# Patient Record
Sex: FEMALE | Race: WHITE | NOT HISPANIC OR LATINO | Employment: FULL TIME | URBAN - METROPOLITAN AREA
[De-identification: names, ages, dates, MRNs, and addresses within clinical notes are randomized per-mention and may not be internally consistent; named-entity substitution may affect disease eponyms.]

---

## 2017-05-04 ENCOUNTER — APPOINTMENT (OUTPATIENT)
Dept: WOMENS IMAGING | Facility: HOSPITAL | Age: 42
End: 2017-05-04

## 2017-05-04 PROCEDURE — 77067 SCR MAMMO BI INCL CAD: CPT | Performed by: RADIOLOGY

## 2017-05-04 PROCEDURE — 77063 BREAST TOMOSYNTHESIS BI: CPT | Performed by: RADIOLOGY

## 2018-05-15 ENCOUNTER — APPOINTMENT (OUTPATIENT)
Dept: WOMENS IMAGING | Facility: HOSPITAL | Age: 43
End: 2018-05-15

## 2018-05-15 PROCEDURE — 77067 SCR MAMMO BI INCL CAD: CPT | Performed by: RADIOLOGY

## 2018-05-15 PROCEDURE — 77063 BREAST TOMOSYNTHESIS BI: CPT | Performed by: RADIOLOGY

## 2018-11-07 ENCOUNTER — APPOINTMENT (OUTPATIENT)
Dept: WOMENS IMAGING | Facility: HOSPITAL | Age: 43
End: 2018-11-07

## 2018-11-07 PROCEDURE — 77065 DX MAMMO INCL CAD UNI: CPT | Performed by: RADIOLOGY

## 2018-11-07 PROCEDURE — 76641 ULTRASOUND BREAST COMPLETE: CPT | Performed by: RADIOLOGY

## 2018-11-07 PROCEDURE — 77061 BREAST TOMOSYNTHESIS UNI: CPT | Performed by: RADIOLOGY

## 2018-11-07 PROCEDURE — G0279 TOMOSYNTHESIS, MAMMO: HCPCS | Performed by: RADIOLOGY

## 2019-05-29 ENCOUNTER — APPOINTMENT (OUTPATIENT)
Dept: WOMENS IMAGING | Facility: HOSPITAL | Age: 44
End: 2019-05-29

## 2019-05-29 PROCEDURE — 76641 ULTRASOUND BREAST COMPLETE: CPT | Performed by: RADIOLOGY

## 2019-05-29 PROCEDURE — 77062 BREAST TOMOSYNTHESIS BI: CPT | Performed by: RADIOLOGY

## 2019-05-29 PROCEDURE — 77066 DX MAMMO INCL CAD BI: CPT | Performed by: RADIOLOGY

## 2019-05-29 PROCEDURE — G0279 TOMOSYNTHESIS, MAMMO: HCPCS | Performed by: RADIOLOGY

## 2020-07-29 ENCOUNTER — APPOINTMENT (OUTPATIENT)
Dept: WOMENS IMAGING | Facility: HOSPITAL | Age: 45
End: 2020-07-29

## 2020-07-29 PROCEDURE — 77063 BREAST TOMOSYNTHESIS BI: CPT | Performed by: RADIOLOGY

## 2020-07-29 PROCEDURE — 77067 SCR MAMMO BI INCL CAD: CPT | Performed by: RADIOLOGY

## 2020-10-14 ENCOUNTER — OFFICE VISIT (OUTPATIENT)
Dept: FAMILY MEDICINE CLINIC | Facility: CLINIC | Age: 45
End: 2020-10-14

## 2020-10-14 VITALS
HEART RATE: 92 BPM | TEMPERATURE: 97.3 F | WEIGHT: 155 LBS | DIASTOLIC BLOOD PRESSURE: 70 MMHG | SYSTOLIC BLOOD PRESSURE: 108 MMHG | OXYGEN SATURATION: 99 % | BODY MASS INDEX: 22.96 KG/M2 | RESPIRATION RATE: 16 BRPM | HEIGHT: 69 IN

## 2020-10-14 DIAGNOSIS — R10.32 LEFT LOWER QUADRANT ABDOMINAL PAIN: ICD-10-CM

## 2020-10-14 DIAGNOSIS — Z00.00 PHYSICAL EXAM: Primary | ICD-10-CM

## 2020-10-14 LAB
ALBUMIN SERPL-MCNC: 4.6 G/DL (ref 3.5–5.2)
ALBUMIN/GLOB SERPL: 2.1 G/DL
ALP SERPL-CCNC: 42 U/L (ref 39–117)
ALT SERPL-CCNC: 12 U/L (ref 1–33)
AST SERPL-CCNC: 15 U/L (ref 1–32)
BILIRUB SERPL-MCNC: 0.4 MG/DL (ref 0–1.2)
BUN SERPL-MCNC: 9 MG/DL (ref 6–20)
BUN/CREAT SERPL: 12.5 (ref 7–25)
CALCIUM SERPL-MCNC: 9 MG/DL (ref 8.6–10.5)
CHLORIDE SERPL-SCNC: 105 MMOL/L (ref 98–107)
CHOLEST SERPL-MCNC: 170 MG/DL (ref 0–200)
CO2 SERPL-SCNC: 26 MMOL/L (ref 22–29)
CREAT SERPL-MCNC: 0.72 MG/DL (ref 0.57–1)
GLOBULIN SER CALC-MCNC: 2.2 GM/DL
GLUCOSE SERPL-MCNC: 90 MG/DL (ref 65–99)
HDLC SERPL-MCNC: 63 MG/DL (ref 40–60)
LDLC SERPL CALC-MCNC: 92 MG/DL (ref 0–100)
LDLC/HDLC SERPL: 1.43 {RATIO}
POTASSIUM SERPL-SCNC: 4.5 MMOL/L (ref 3.5–5.2)
PROT SERPL-MCNC: 6.8 G/DL (ref 6–8.5)
SODIUM SERPL-SCNC: 139 MMOL/L (ref 136–145)
TRIGL SERPL-MCNC: 83 MG/DL (ref 0–150)
VLDLC SERPL CALC-MCNC: 15 MG/DL (ref 5–40)

## 2020-10-14 PROCEDURE — 99386 PREV VISIT NEW AGE 40-64: CPT | Performed by: NURSE PRACTITIONER

## 2020-10-14 RX ORDER — AZELASTINE 1 MG/ML
1 SPRAY, METERED NASAL DAILY
COMMUNITY
Start: 2020-09-10 | End: 2022-03-23

## 2020-10-14 NOTE — PROGRESS NOTES
Betty Moore is a 45 y.o. female.   PMHX:seasonal allergies, and has had some pulling and aching to her left lower side that she though was secondary to her hysterectomy.Stopped drinking beer about a month ago and the pain is better but still there.  PSHX: and mouth surgery  PFHX: reviewed in chart and with pt  Exercise:regular and goes to [employer]  Diet:well balanced  Sleep hygiene:  Employment status:is an       History of Present Illness   See above  The following portions of the patient's history were reviewed and updated as appropriate: allergies, current medications, past family history, past medical history, past social history, past surgical history and problem list.    Review of Systems   Constitutional: Negative for activity change, appetite change, chills, fatigue and fever.   HENT: Negative for congestion.    Eyes: Negative for pain.   Respiratory: Negative for cough and shortness of breath.    Cardiovascular: Negative for chest pain and leg swelling.   Gastrointestinal: Positive for abdominal pain. Negative for nausea and vomiting.   Genitourinary: Negative for difficulty urinating.   Skin: Negative for rash.   Neurological: Negative for dizziness, facial asymmetry and headache.       Objective   Physical Exam  Vitals signs and nursing note reviewed.   Constitutional:       General: She is not in acute distress.     Appearance: She is well-developed.   HENT:      Head: Normocephalic and atraumatic.      Right Ear: External ear normal.      Left Ear: External ear normal.   Neck:      Musculoskeletal: Neck supple.      Thyroid: No thyromegaly.   Cardiovascular:      Rate and Rhythm: Normal rate and regular rhythm.      Heart sounds: Normal heart sounds.   Pulmonary:      Effort: Pulmonary effort is normal.      Breath sounds: Normal breath sounds.   Abdominal:      General: Abdomen is flat. There is no distension.      Palpations: Abdomen is soft.      Tenderness:  There is no abdominal tenderness.   Musculoskeletal: Normal range of motion.   Skin:     General: Skin is warm.   Neurological:      Mental Status: She is alert.           Assessment/Plan   Diagnoses and all orders for this visit:    1. Physical exam (Primary)  -     Comprehensive Metabolic Panel  -     Lipid Panel With LDL / HDL Ratio    2. Left lower quadrant abdominal pain  -     Ambulatory Referral to Gastroenterology      Referral placed to gastroenterology.  We will call her with her results of her lab work    Preventative counseling for diet and exercise with patient at visit.  Pt reports that they wear their seatbelt regularly.

## 2020-10-14 NOTE — PROGRESS NOTES
Preventive Exam    History of Present Illness: Clarisse Moore is a 45 y.o. here for check up and review of routine health maintenance. she states she is doing well and has no concerns.    Past medical history, surgical history and family history have been reviewed.     REVIEW OF SYSTEMS  Constitutional: Negative.    HENT: Negative.    Eyes: Negative.    Respiratory: Negative.    Cardiovascular: Negative.    Gastrointestinal: Negative.    Endocrine: Negative.    Genitourinary: Negative.    Musculoskeletal: Negative.  Skin: Negative.    Allergic/Immunologic: Negative.    Neurological: Negative.    Hematological: Negative.    Psychiatric/Behavioral: Negative.      Review of Systems    PHYSICAL EXAM    Vitals:    10/14/20 1057   BP: 108/70   Pulse: 92   Resp: 16   Temp: 97.3 °F (36.3 °C)   SpO2: 99%       GENERAL: alert and oriented, afebrile and vital signs stable  HEENT: oral mucosa moist, PEERLA, EOM, conjunctiva normal  No cervical adenopathy  LUNGS: clear to ascultation bilaterally, no rales, ronchi or wheezing  HEART: RRR S1 S2 without murmers, thrills, rubs or gallops  CHEST WALL: within normal limits, no tenderness  ABDOMEN: WNL. Normal BS.  EXTREMITIES: No clubbing, cyanosis or edema noted. Normal Pulses.  SKIN: warm, dry, no rashes noted  NEURO: CN II- XII grossly intact    Physical Exam    Procedures    There are no diagnoses linked to this encounter.    Problems Addressed this Visit     None      Diagnoses    None.         Routine health maintenance reviewed and discussed with Clarisse Moore.

## 2021-01-06 ENCOUNTER — OFFICE VISIT (OUTPATIENT)
Dept: GASTROENTEROLOGY | Facility: CLINIC | Age: 46
End: 2021-01-06

## 2021-01-06 VITALS — BODY MASS INDEX: 23.22 KG/M2 | TEMPERATURE: 97.6 F | HEIGHT: 69 IN | WEIGHT: 156.8 LBS

## 2021-01-06 DIAGNOSIS — K62.5 RECTAL BLEEDING: ICD-10-CM

## 2021-01-06 DIAGNOSIS — R10.32 LLQ PAIN: Primary | ICD-10-CM

## 2021-01-06 PROCEDURE — 99203 OFFICE O/P NEW LOW 30 MIN: CPT | Performed by: INTERNAL MEDICINE

## 2021-01-06 NOTE — PROGRESS NOTES
Subjective   Chief Complaint   Patient presents with   • Abdominal Pain       Clarisse Moore is a  45 y.o. female here for initial visit for abdominal pain.    She reports pain in her abdomen - c/o pulling/stretching sensation in llq.  She had attributed it to previous hysterectomy in .  Negative gyn eval recently.    She reports the issues were really intense back over the summer and this has improved significantly.  She increased her fiber and water and it has improved significantly.  She reports issues previously with constipation and incomplete emptying.  She still reports some abnormal sensation on the left side but it is better.  She reports blood in the stool a few times per year. PGF had CRC.  PGM and dad had celiac.     HPI  Past Medical History:   Diagnosis Date   • Allergic    • Depression    • Fear of flying      Past Surgical History:   Procedure Laterality Date   •  SECTION     • HYSTERECTOMY      partial    • MOUTH SURGERY         Current Outpatient Medications:   •  azelastine (ASTELIN) 0.1 % nasal spray, U 2 SPRAYS IEN BID, Disp: , Rfl:   •  loratadine (CLARITIN) 10 MG tablet, Take 10 mg by mouth daily., Disp: , Rfl:   •  VITAMIN D, CHOLECALCIFEROL, PO, Take  by mouth., Disp: , Rfl:   PRN Meds:.  Allergies   Allergen Reactions   • Codeine GI Intolerance     Social History     Socioeconomic History   • Marital status:      Spouse name: Not on file   • Number of children: Not on file   • Years of education: Not on file   • Highest education level: Not on file   Tobacco Use   • Smoking status: Former Smoker     Start date: 1997     Quit date: 2010     Years since quittin.0   • Smokeless tobacco: Never Used     Family History   Problem Relation Age of Onset   • Breast cancer Paternal Aunt    • Celiac disease Paternal Aunt    • Heart disease Paternal Grandmother    • Lung cancer Paternal Grandmother    • Colon cancer Paternal Grandfather    • Celiac disease Father      Review  of Systems   Constitutional: Negative for appetite change and unexpected weight change.   Gastrointestinal: Positive for abdominal pain, blood in stool and constipation.   All other systems reviewed and are negative.    Vitals:    01/06/21 1314   Temp: 97.6 °F (36.4 °C)         01/06/21  1314   Weight: 71.1 kg (156 lb 12.8 oz)       Objective   Physical Exam  Constitutional:       Appearance: She is well-developed.   HENT:      Head: Normocephalic and atraumatic.   Eyes:      General: No scleral icterus.  Abdominal:      General: There is no distension.      Palpations: Abdomen is soft. There is no mass.      Tenderness: There is abdominal tenderness.       Skin:     General: Skin is warm and dry.   Neurological:      Mental Status: She is alert.       No radiology results for the last 7 days    Assessment/Plan   Diagnoses and all orders for this visit:    LLQ pain  -     Case Request; Standing  -     Case Request    Rectal bleeding  -     Case Request; Standing  -     Case Request    Other orders  -     Follow Anesthesia Guidelines / Standing Orders; Future  -     Obtain Informed Consent; Future  -     Implement Anesthesia orders day of procedure.; Standing  -     Obtain informed consent; Standing  -     Verify bowel prep was successful; Standing  -     Give tap water enema if bowel prep was insufficient; Standing      Plan:  · Continue adequate water and fiber intake-this has improved her symptoms tremendously.  · Given rectal bleeding and persistent abnormal sensation in the left lower quadrant, will proceed with colonoscopy for further evaluation

## 2021-02-02 ENCOUNTER — TELEPHONE (OUTPATIENT)
Dept: GASTROENTEROLOGY | Facility: CLINIC | Age: 46
End: 2021-02-02

## 2021-02-02 NOTE — TELEPHONE ENCOUNTER
----- Message from Manuel Worley sent at 2/2/2021  3:11 PM EST -----  Regarding: reschedule scope  Contact: [phone]  Pt left a message to reschedule scope,

## 2021-03-30 ENCOUNTER — TRANSCRIBE ORDERS (OUTPATIENT)
Dept: LAB | Facility: HOSPITAL | Age: 46
End: 2021-03-30

## 2021-03-30 DIAGNOSIS — Z01.818 OTHER SPECIFIED PRE-OPERATIVE EXAMINATION: Primary | ICD-10-CM

## 2021-04-05 ENCOUNTER — LAB (OUTPATIENT)
Dept: LAB | Facility: HOSPITAL | Age: 46
End: 2021-04-05

## 2021-04-05 DIAGNOSIS — Z01.818 OTHER SPECIFIED PRE-OPERATIVE EXAMINATION: ICD-10-CM

## 2021-04-05 PROCEDURE — U0004 COV-19 TEST NON-CDC HGH THRU: HCPCS

## 2021-04-05 PROCEDURE — C9803 HOPD COVID-19 SPEC COLLECT: HCPCS

## 2021-04-06 ENCOUNTER — BULK ORDERING (OUTPATIENT)
Dept: CASE MANAGEMENT | Facility: OTHER | Age: 46
End: 2021-04-06

## 2021-04-06 DIAGNOSIS — Z23 IMMUNIZATION DUE: ICD-10-CM

## 2021-04-06 LAB — SARS-COV-2 RNA RESP QL NAA+PROBE: NOT DETECTED

## 2021-04-07 ENCOUNTER — ANESTHESIA (OUTPATIENT)
Dept: GASTROENTEROLOGY | Facility: HOSPITAL | Age: 46
End: 2021-04-07

## 2021-04-07 ENCOUNTER — HOSPITAL ENCOUNTER (OUTPATIENT)
Facility: HOSPITAL | Age: 46
Setting detail: HOSPITAL OUTPATIENT SURGERY
Discharge: HOME | End: 2021-04-07
Attending: INTERNAL MEDICINE | Admitting: INTERNAL MEDICINE

## 2021-04-07 ENCOUNTER — ANESTHESIA EVENT (OUTPATIENT)
Dept: GASTROENTEROLOGY | Facility: HOSPITAL | Age: 46
End: 2021-04-07

## 2021-04-07 VITALS
TEMPERATURE: 98.1 F | DIASTOLIC BLOOD PRESSURE: 60 MMHG | HEIGHT: 69 IN | WEIGHT: 151.4 LBS | RESPIRATION RATE: 18 BRPM | HEART RATE: 72 BPM | OXYGEN SATURATION: 96 % | SYSTOLIC BLOOD PRESSURE: 100 MMHG | BODY MASS INDEX: 22.42 KG/M2

## 2021-04-07 DIAGNOSIS — R10.32 LLQ PAIN: ICD-10-CM

## 2021-04-07 DIAGNOSIS — K62.5 RECTAL BLEEDING: ICD-10-CM

## 2021-04-07 PROCEDURE — 25010000002 PROPOFOL 10 MG/ML EMULSION: Performed by: ANESTHESIOLOGY

## 2021-04-07 PROCEDURE — 45380 COLONOSCOPY AND BIOPSY: CPT | Performed by: INTERNAL MEDICINE

## 2021-04-07 PROCEDURE — 45385 COLONOSCOPY W/LESION REMOVAL: CPT | Performed by: INTERNAL MEDICINE

## 2021-04-07 PROCEDURE — 88305 TISSUE EXAM BY PATHOLOGIST: CPT | Performed by: INTERNAL MEDICINE

## 2021-04-07 PROCEDURE — S0260 H&P FOR SURGERY: HCPCS | Performed by: INTERNAL MEDICINE

## 2021-04-07 RX ORDER — SODIUM CHLORIDE 0.9 % (FLUSH) 0.9 %
3 SYRINGE (ML) INJECTION EVERY 12 HOURS SCHEDULED
Status: DISCONTINUED | OUTPATIENT
Start: 2021-04-07 | End: 2021-04-07 | Stop reason: HOSPADM

## 2021-04-07 RX ORDER — PROPOFOL 10 MG/ML
VIAL (ML) INTRAVENOUS CONTINUOUS PRN
Status: DISCONTINUED | OUTPATIENT
Start: 2021-04-07 | End: 2021-04-07 | Stop reason: SURG

## 2021-04-07 RX ORDER — LIDOCAINE HYDROCHLORIDE 20 MG/ML
INJECTION, SOLUTION INFILTRATION; PERINEURAL AS NEEDED
Status: DISCONTINUED | OUTPATIENT
Start: 2021-04-07 | End: 2021-04-07 | Stop reason: SURG

## 2021-04-07 RX ORDER — PROPOFOL 10 MG/ML
VIAL (ML) INTRAVENOUS AS NEEDED
Status: DISCONTINUED | OUTPATIENT
Start: 2021-04-07 | End: 2021-04-07 | Stop reason: SURG

## 2021-04-07 RX ORDER — SODIUM CHLORIDE 0.9 % (FLUSH) 0.9 %
10 SYRINGE (ML) INJECTION AS NEEDED
Status: DISCONTINUED | OUTPATIENT
Start: 2021-04-07 | End: 2021-04-07 | Stop reason: HOSPADM

## 2021-04-07 RX ORDER — SODIUM CHLORIDE, SODIUM LACTATE, POTASSIUM CHLORIDE, CALCIUM CHLORIDE 600; 310; 30; 20 MG/100ML; MG/100ML; MG/100ML; MG/100ML
30 INJECTION, SOLUTION INTRAVENOUS CONTINUOUS PRN
Status: DISCONTINUED | OUTPATIENT
Start: 2021-04-07 | End: 2021-04-07 | Stop reason: HOSPADM

## 2021-04-07 RX ADMIN — PROPOFOL 50 MG: 10 INJECTION, EMULSION INTRAVENOUS at 10:27

## 2021-04-07 RX ADMIN — PROPOFOL 50 MG: 10 INJECTION, EMULSION INTRAVENOUS at 10:34

## 2021-04-07 RX ADMIN — PROPOFOL 125 MCG/KG/MIN: 10 INJECTION, EMULSION INTRAVENOUS at 10:19

## 2021-04-07 RX ADMIN — LIDOCAINE HYDROCHLORIDE 50 MG: 20 INJECTION, SOLUTION INFILTRATION; PERINEURAL at 10:18

## 2021-04-07 RX ADMIN — PROPOFOL 75 MG: 10 INJECTION, EMULSION INTRAVENOUS at 10:19

## 2021-04-07 RX ADMIN — SODIUM CHLORIDE, POTASSIUM CHLORIDE, SODIUM LACTATE AND CALCIUM CHLORIDE 30 ML/HR: 600; 310; 30; 20 INJECTION, SOLUTION INTRAVENOUS at 10:10

## 2021-04-07 NOTE — ANESTHESIA PREPROCEDURE EVALUATION
Anesthesia Evaluation     Patient summary reviewed                Airway   No difficulty expected  Dental      Pulmonary    Cardiovascular     Rhythm: regular        Neuro/Psych  GI/Hepatic/Renal/Endo      Musculoskeletal     Abdominal    Substance History      OB/GYN          Other                        Anesthesia Plan    ASA 2     MAC       Anesthetic plan, all risks, benefits, and alternatives have been provided, discussed and informed consent has been obtained with: patient.

## 2021-04-07 NOTE — H&P
[facility]  Pre Procedure History & Physical    Chief Complaint:   llq pain, rectal bleeding    Subjective     HPI:   Clarisse Moore is a  45 y.o. female here for initial visit for abdominal pain.     She reports pain in her abdomen - c/o pulling/stretching sensation in llq.  She had attributed it to previous hysterectomy in .  Negative gyn eval recently.     She reports the issues were really intense back over the summer and this has improved significantly.  She increased her fiber and water and it has improved significantly.  She reports issues previously with constipation and incomplete emptying.  She still reports some abnormal sensation on the left side but it is better.  She reports blood in the stool a few times per year. PGF had CRC.  PGM and dad had celiac.       Past Medical History:   Past Medical History:   Diagnosis Date   • Abdominal pain     LEFT   • Constipation    • Depression    • Environmental allergies    • Fear of flying    • PONV (postoperative nausea and vomiting)        Past Surgical History:  Past Surgical History:   Procedure Laterality Date   •  SECTION     • HYSTERECTOMY      partial    • MOUTH SURGERY         Family History:  Family History   Problem Relation Age of Onset   • Breast cancer Paternal Aunt    • Celiac disease Paternal Aunt    • Heart disease Paternal Grandmother    • Lung cancer Paternal Grandmother    • Colon cancer Paternal Grandfather    • Celiac disease Father    • Malig Hyperthermia Neg Hx        Social History:   reports that she quit smoking about 11 years ago. Her smoking use included cigarettes. She started smoking about 24 years ago. She has never used smokeless tobacco. She reports current alcohol use of about 5.0 standard drinks of alcohol per week. She reports that she does not use drugs.    Medications:   Medications Prior to Admission   Medication Sig Dispense Refill Last Dose   • azelastine (ASTELIN) 0.1 % nasal spray 1  "spray into the nostril(s) as directed by provider Daily.   Past Week at Unknown time   • loratadine (CLARITIN) 10 MG tablet Take 10 mg by mouth daily.   Past Week at Unknown time   • VITAMIN D, CHOLECALCIFEROL, PO Take 50,000 Units by mouth Daily.   4/1/2021       Allergies:  Codeine    ROS:    Pertinent items are noted in HPI, all other systems reviewed and negative     Objective     Blood pressure 97/57, pulse 79, temperature 98.1 °F (36.7 °C), temperature source Oral, resp. rate 18, height 175.3 cm (69\"), weight 68.7 kg (151 lb 6.4 oz), SpO2 97 %.    Physical Exam   Constitutional: Pt is oriented to person, place, and time and well-developed, well-nourished, and in no distress.   Mouth/Throat: Oropharynx is clear and moist.   Neck: Normal range of motion.   Cardiovascular: Normal rate, regular rhythm    Pulmonary/Chest: Effort normal    Abdominal: Soft. Nontender  Skin: Skin is warm and dry.   Psychiatric: Mood, memory, affect and judgment normal.     Assessment/Plan     Diagnosis:  llq pain, rectal bleeding    Anticipated Surgical Procedure:  colonoscopy    The risks, benefits, and alternatives of this procedure have been discussed with the patient or the responsible party- the patient understands and agrees to proceed.                                                              "

## 2021-04-07 NOTE — ANESTHESIA POSTPROCEDURE EVALUATION
"Patient: Clarisse Moroe    Procedure Summary     Date: 04/07/21 Room / Location: [facility] ENDOSCOPY 10 / [facility] ENDOSCOPY    Anesthesia Start: 1016 Anesthesia Stop: 1052    Procedure: COLONOSCOPY TO CECUM AND INTO TERMINAL ILEUM WITH HOT AND COLD SNARE POLYPECTOMY AND COLD BIOPSIES (N/A ) Diagnosis:       LLQ pain      Rectal bleeding      (LLQ pain [R10.32])      (Rectal bleeding [K62.5])    Surgeons: Ade Damon MD Provider: Marty Beltran MD    Anesthesia Type: MAC ASA Status: 2          Anesthesia Type: MAC    Vitals  Vitals Value Taken Time   /60 04/07/21 1109   Temp     Pulse 72 04/07/21 1109   Resp 18 04/07/21 1109   SpO2 96 % 04/07/21 1109           Post Anesthesia Care and Evaluation    Patient location during evaluation: bedside  Patient participation: complete - patient participated  Level of consciousness: awake and alert  Pain management: adequate  Airway patency: patent  Anesthetic complications: No anesthetic complications    Cardiovascular status: acceptable  Respiratory status: acceptable  Hydration status: acceptable    Comments: /60 (BP Location: Left arm, Patient Position: Lying)   Pulse 72   Temp 36.7 °C (98.1 °F) (Oral)   Resp 18   Ht 175.3 cm (69\")   Wt 68.7 kg (151 lb 6.4 oz)   SpO2 96%   BMI 22.36 kg/m²       "

## 2021-04-08 LAB
CYTO UR: NORMAL
LAB AP CASE REPORT: NORMAL
LAB AP DIAGNOSIS COMMENT: NORMAL
PATH REPORT.FINAL DX SPEC: NORMAL
PATH REPORT.GROSS SPEC: NORMAL

## 2021-04-14 ENCOUNTER — TELEPHONE (OUTPATIENT)
Dept: GASTROENTEROLOGY | Facility: CLINIC | Age: 46
End: 2021-04-14

## 2021-04-14 NOTE — TELEPHONE ENCOUNTER
1 of The polyp(s) biopsies showed adenomatous change. This is not cancerous but is considered potentially precancerous. Follow-up colonoscopy in 5 years is advised.     Colon biopsies were normal.  The biopsies of the ileum which is the small intestine where it meets the colon showed inflammation and visual ulceration.  This can be seen with Crohn's disease.  I would like to see her in the office to discuss this further.  Please see if she is available on Monday, April 19 at 1215

## 2021-04-14 NOTE — TELEPHONE ENCOUNTER
Called pt and advised of Dr Damon's note. Verb understanding.     C/s placed in recall and hm for 04/07/2026.    Message sent to Dr Damon. Pt can make appt on 04/19 at 1215 with Dr Damon.

## 2021-04-19 ENCOUNTER — OFFICE VISIT (OUTPATIENT)
Dept: GASTROENTEROLOGY | Facility: CLINIC | Age: 46
End: 2021-04-19

## 2021-04-19 ENCOUNTER — LAB (OUTPATIENT)
Dept: LAB | Facility: HOSPITAL | Age: 46
End: 2021-04-19

## 2021-04-19 VITALS — WEIGHT: 156.2 LBS | BODY MASS INDEX: 23.13 KG/M2 | HEIGHT: 69 IN

## 2021-04-19 DIAGNOSIS — D12.6 ADENOMATOUS POLYP OF COLON, UNSPECIFIED PART OF COLON: ICD-10-CM

## 2021-04-19 DIAGNOSIS — Z83.79 FH: CELIAC DISEASE: ICD-10-CM

## 2021-04-19 DIAGNOSIS — K52.9 ILEITIS: Primary | ICD-10-CM

## 2021-04-19 LAB
ALBUMIN SERPL-MCNC: 4.2 G/DL (ref 3.5–5.2)
ALBUMIN/GLOB SERPL: 1.8 G/DL
ALP SERPL-CCNC: 40 U/L (ref 39–117)
ALT SERPL W P-5'-P-CCNC: 12 U/L (ref 1–33)
ANION GAP SERPL CALCULATED.3IONS-SCNC: 9.6 MMOL/L (ref 5–15)
AST SERPL-CCNC: 18 U/L (ref 1–32)
BASOPHILS # BLD AUTO: 0.07 10*3/MM3 (ref 0–0.2)
BASOPHILS NFR BLD AUTO: 1 % (ref 0–1.5)
BILIRUB SERPL-MCNC: 0.3 MG/DL (ref 0–1.2)
BUN SERPL-MCNC: 13 MG/DL (ref 6–20)
BUN/CREAT SERPL: 18.3 (ref 7–25)
CALCIUM SPEC-SCNC: 8.5 MG/DL (ref 8.6–10.5)
CHLORIDE SERPL-SCNC: 104 MMOL/L (ref 98–107)
CO2 SERPL-SCNC: 24.4 MMOL/L (ref 22–29)
CREAT SERPL-MCNC: 0.71 MG/DL (ref 0.57–1)
DEPRECATED RDW RBC AUTO: 38 FL (ref 37–54)
EOSINOPHIL # BLD AUTO: 0.19 10*3/MM3 (ref 0–0.4)
EOSINOPHIL NFR BLD AUTO: 2.6 % (ref 0.3–6.2)
ERYTHROCYTE [DISTWIDTH] IN BLOOD BY AUTOMATED COUNT: 12.8 % (ref 12.3–15.4)
FERRITIN SERPL-MCNC: 16.4 NG/ML (ref 13–150)
GFR SERPL CREATININE-BSD FRML MDRD: 89 ML/MIN/1.73
GLOBULIN UR ELPH-MCNC: 2.4 GM/DL
GLUCOSE SERPL-MCNC: 90 MG/DL (ref 65–99)
HCT VFR BLD AUTO: 39.1 % (ref 34–46.6)
HGB BLD-MCNC: 13.1 G/DL (ref 12–15.9)
IMM GRANULOCYTES # BLD AUTO: 0.02 10*3/MM3 (ref 0–0.05)
IMM GRANULOCYTES NFR BLD AUTO: 0.3 % (ref 0–0.5)
LYMPHOCYTES # BLD AUTO: 2.23 10*3/MM3 (ref 0.7–3.1)
LYMPHOCYTES NFR BLD AUTO: 30.7 % (ref 19.6–45.3)
MCH RBC QN AUTO: 28 PG (ref 26.6–33)
MCHC RBC AUTO-ENTMCNC: 33.5 G/DL (ref 31.5–35.7)
MCV RBC AUTO: 83.5 FL (ref 79–97)
MONOCYTES # BLD AUTO: 0.65 10*3/MM3 (ref 0.1–0.9)
MONOCYTES NFR BLD AUTO: 8.9 % (ref 5–12)
NEUTROPHILS NFR BLD AUTO: 4.11 10*3/MM3 (ref 1.7–7)
NEUTROPHILS NFR BLD AUTO: 56.5 % (ref 42.7–76)
NRBC BLD AUTO-RTO: 0.1 /100 WBC (ref 0–0.2)
PLATELET # BLD AUTO: 283 10*3/MM3 (ref 140–450)
PMV BLD AUTO: 10.2 FL (ref 6–12)
POTASSIUM SERPL-SCNC: 4.4 MMOL/L (ref 3.5–5.2)
PROT SERPL-MCNC: 6.6 G/DL (ref 6–8.5)
RBC # BLD AUTO: 4.68 10*6/MM3 (ref 3.77–5.28)
SODIUM SERPL-SCNC: 138 MMOL/L (ref 136–145)
VIT B12 BLD-MCNC: 224 PG/ML (ref 211–946)
WBC # BLD AUTO: 7.27 10*3/MM3 (ref 3.4–10.8)

## 2021-04-19 PROCEDURE — 82728 ASSAY OF FERRITIN: CPT | Performed by: INTERNAL MEDICINE

## 2021-04-19 PROCEDURE — 36415 COLL VENOUS BLD VENIPUNCTURE: CPT | Performed by: INTERNAL MEDICINE

## 2021-04-19 PROCEDURE — 82542 COL CHROMOTOGRAPHY QUAL/QUAN: CPT | Performed by: INTERNAL MEDICINE

## 2021-04-19 PROCEDURE — 83516 IMMUNOASSAY NONANTIBODY: CPT | Performed by: INTERNAL MEDICINE

## 2021-04-19 PROCEDURE — 85025 COMPLETE CBC W/AUTO DIFF WBC: CPT | Performed by: INTERNAL MEDICINE

## 2021-04-19 PROCEDURE — 99214 OFFICE O/P EST MOD 30 MIN: CPT | Performed by: INTERNAL MEDICINE

## 2021-04-19 PROCEDURE — 82607 VITAMIN B-12: CPT | Performed by: INTERNAL MEDICINE

## 2021-04-19 PROCEDURE — 80053 COMPREHEN METABOLIC PANEL: CPT | Performed by: INTERNAL MEDICINE

## 2021-04-19 PROCEDURE — 82784 ASSAY IGA/IGD/IGG/IGM EACH: CPT | Performed by: INTERNAL MEDICINE

## 2021-04-19 PROCEDURE — 82657 ENZYME CELL ACTIVITY: CPT | Performed by: INTERNAL MEDICINE

## 2021-04-19 NOTE — PROGRESS NOTES
Subjective   Chief Complaint   Patient presents with   • C scope follow up       Clarisse Moore is a  46 y.o. female here for a follow up visit for recent colonoscopy which revealed ileitis.     Patient underwent a recent colonoscopy due to complaints of left lower quadrant pain and rectal bleeding.  She is felt fine since her colonoscopy.  She reports episodic bloating that she relates to eating.  Is been present for approximately a year.  She said no further abdominal pain or rectal bleeding.  She denies any recent regular NSAID use.  She does report that 2 to 3 years ago prior to her hysterectomy she was using NSAIDs regularly.  She has no history of pelvic radiation.    Colonoscopy revealed ileal ulceration.  Biopsies revealed acute inflammation-no granulomas seen.  She had a tubular adenoma removed.  Colon biopsies showed no significant inflammation.  HPI  Past Medical History:   Diagnosis Date   • Abdominal pain     LEFT   • Constipation    • Depression    • Environmental allergies    • Fear of flying    • PONV (postoperative nausea and vomiting)      Past Surgical History:   Procedure Laterality Date   •  SECTION     • COLONOSCOPY N/A 2021    Procedure: COLONOSCOPY TO CECUM AND INTO TERMINAL ILEUM WITH HOT AND COLD SNARE POLYPECTOMY AND COLD BIOPSIES;  Surgeon: Ade Damon MD;  Location: [facility] ENDOSCOPY;  Service: Gastroenterology;  Laterality: N/A;  PRE: LLQ PAIN, RECTAL BLEEDING  POST: POLYPS, HEMORRHOIDS, ILEITIS   • HYSTERECTOMY      partial    • MOUTH SURGERY         Current Outpatient Medications:   •  azelastine (ASTELIN) 0.1 % nasal spray, 1 spray into the nostril(s) as directed by provider Daily., Disp: , Rfl:   •  loratadine (CLARITIN) 10 MG tablet, Take 10 mg by mouth daily., Disp: , Rfl:   •  VITAMIN D, CHOLECALCIFEROL, PO, Take 50,000 Units by mouth Daily., Disp: , Rfl:   PRN Meds:.  Allergies   Allergen Reactions   • Codeine GI Intolerance     Social History     Socioeconomic  History   • Marital status:      Spouse name: Not on file   • Number of children: Not on file   • Years of education: Not on file   • Highest education level: Not on file   Tobacco Use   • Smoking status: Former Smoker     Types: Cigarettes     Start date: 1997     Quit date:      Years since quittin.3   • Smokeless tobacco: Never Used   Vaping Use   • Vaping Use: Never used   Substance and Sexual Activity   • Alcohol use: Yes     Alcohol/week: 5.0 standard drinks     Types: 5 Glasses of wine per week   • Drug use: Never   • Sexual activity: Defer     Family History   Problem Relation Age of Onset   • Breast cancer Paternal Aunt    • Celiac disease Paternal Aunt    • Heart disease Paternal Grandmother    • Lung cancer Paternal Grandmother    • Colon cancer Paternal Grandfather    • Celiac disease Father    • Malig Hyperthermia Neg Hx      Review of Systems   Constitutional: Negative for appetite change and unexpected weight change.   Gastrointestinal: Negative for abdominal pain, blood in stool and diarrhea.     There were no vitals filed for this visit.      21  1209   Weight: 70.9 kg (156 lb 3.2 oz)       Objective   Physical Exam  Constitutional:       Appearance: Normal appearance.   Abdominal:      General: There is no distension.   Neurological:      Mental Status: She is alert.       No radiology results for the last 7 days    Assessment/Plan   Diagnoses and all orders for this visit:    Ileitis  -     TPMT Enzyme  -     CBC & Differential  -     Ferritin  -     Vitamin B12  -     Comprehensive Metabolic Panel  -     CT Abdomen Pelvis With Contrast Enterography    FH: celiac disease  -     Celiac Ab tTG DGP TIgA    Adenomatous polyp of colon, unspecified part of colon      Plan:  · Suspect Crohn's disease based on endoscopic appearance of her ileum-no risk factors for alternative diagnoses.  We will check a CT enterography for further evaluation.  We will check additional lab work  today as she has a family history of celiac disease.  We will check TPMT for consideration of immunomodulators therapy.  · We will check iron and B12 levels

## 2021-04-20 LAB
GLIADIN PEPTIDE IGA SER-ACNC: 5 UNITS (ref 0–19)
GLIADIN PEPTIDE IGG SER-ACNC: 2 UNITS (ref 0–19)
IGA SERPL-MCNC: 139 MG/DL (ref 87–352)
TTG IGA SER-ACNC: <2 U/ML (ref 0–3)
TTG IGG SER-ACNC: <2 U/ML (ref 0–5)

## 2021-04-28 LAB — REF LAB TEST METHOD: NORMAL

## 2021-05-20 ENCOUNTER — APPOINTMENT (OUTPATIENT)
Dept: CT IMAGING | Facility: HOSPITAL | Age: 46
End: 2021-05-20

## 2021-05-25 ENCOUNTER — TELEPHONE (OUTPATIENT)
Dept: GASTROENTEROLOGY | Facility: CLINIC | Age: 46
End: 2021-05-25

## 2021-05-25 NOTE — TELEPHONE ENCOUNTER
----- Message from Manuel Marcial sent at 5/25/2021  1:20 PM EDT -----  Regarding: CT Scan  Contact: [phone]  PT states CT scan has been denied by insurance again and would like to discuss next steps, please call PT

## 2021-05-26 ENCOUNTER — APPOINTMENT (OUTPATIENT)
Dept: CT IMAGING | Facility: HOSPITAL | Age: 46
End: 2021-05-26

## 2021-05-28 ENCOUNTER — HOSPITAL ENCOUNTER (OUTPATIENT)
Dept: CT IMAGING | Facility: HOSPITAL | Age: 46
Discharge: HOME | End: 2021-05-28
Admitting: INTERNAL MEDICINE

## 2021-05-28 PROCEDURE — 74177 CT ABD & PELVIS W/CONTRAST: CPT

## 2021-05-28 PROCEDURE — 25010000002 IOPAMIDOL 61 % SOLUTION: Performed by: INTERNAL MEDICINE

## 2021-05-28 RX ADMIN — IOPAMIDOL 85 ML: 612 INJECTION, SOLUTION INTRAVENOUS at 14:47

## 2021-06-07 ENCOUNTER — TELEPHONE (OUTPATIENT)
Dept: GASTROENTEROLOGY | Facility: CLINIC | Age: 46
End: 2021-06-07

## 2021-06-07 RX ORDER — BUDESONIDE 3 MG/1
9 CAPSULE, COATED PELLETS ORAL DAILY
Qty: 90 CAPSULE | Refills: 3 | Status: SHIPPED | OUTPATIENT
Start: 2021-06-07 | End: 2021-10-01

## 2021-06-07 NOTE — TELEPHONE ENCOUNTER
Call to pt.  Advise per DR Damon note.  Verb understanding.  Accepts appt with DR Damon.     6/17 appt with REJI Villavicencio cancelled.

## 2021-06-07 NOTE — TELEPHONE ENCOUNTER
----- Message from Manuel Dunbar sent at 6/4/2021  8:47 AM EDT -----  Regarding: Results  Contact: [phone]  Pt saw results on My chart and would like someone to call and discuss

## 2021-06-07 NOTE — TELEPHONE ENCOUNTER
Entocort sent to pharmacy.    At this point I would wait on any evaluation for the appendiceal findings.  There did not appear to be any Crohn's involvement of the appendix on her colonoscopy.  We can discuss this further at her follow-up.  I think 617 is too soon to follow-up.  Need period of time on the Entocort to assess her response.  See if she is able to see me on July 15 at noon

## 2021-06-07 NOTE — TELEPHONE ENCOUNTER
Call to pt.  Advise per Dr Damon note.  Verb understanding     Asking if should have any sort of f/u re: appendix.     As far as tx - states would be interested in trying steroid first before progressing to biologic.  Asking if any dietary restrictions ie: avoid etoh, caffeine, etc?    States has f/u appt with M Saud on 6/17 - asking if should still keep this appt.     Questions to Dr Damon.

## 2021-06-07 NOTE — TELEPHONE ENCOUNTER
----- Message from Ade Damon MD sent at 6/2/2021  7:34 AM EDT -----  CT enterography supports a diagnosis of Crohn's disease.  Limited involvement of the small intestine near visualized abnormalities noted on colonoscopy.  No complicating features are seen.  Benign-appearing liver lesion is noted.  Appendix appears slightly thickened which may be artifact versus sign of chronic appendicitis.  Please ask patient if she has had a chance to consider any of the therapy that we discussed and if she has any questions that I can help to answer.

## 2021-06-16 ENCOUNTER — TELEPHONE (OUTPATIENT)
Dept: GASTROENTEROLOGY | Facility: CLINIC | Age: 46
End: 2021-06-16

## 2021-06-16 NOTE — TELEPHONE ENCOUNTER
----- Message from Clarisse Moore sent at 6/16/2021 10:03 AM EDT -----  Regarding: Non-Urgent Medical Question  Contact: [phone]  Hi Dr. Damon,      I think I told you my  has UC and he takes Align probiotic along with his medication.  I meant to ask you last time we spoke if you think it's a good idea for me to take Align too?  I'm bloated after just about every meal.  Thank you for your time, Clarisse Moore

## 2021-06-17 NOTE — TELEPHONE ENCOUNTER
No proven benefit of probiotics in IBD however they do seem to help some people and are fairly harmless.  Okay to try if she wishes

## 2021-06-18 NOTE — TELEPHONE ENCOUNTER
Call to pt.  Advise per Dr Damon note.  Verb understanding.     States is taking budesonide as instructed -states noting for past couple of days a little bit of belly ache and a little diarrhea.  Denies blood, fever.  Thinks may be in response to budesonide and to just continue dosing as ordered.  States does not need call back if no changes.      Advise Dr Damon out of office - will send message to REJI Villavicencio.

## 2021-07-07 ENCOUNTER — TELEPHONE (OUTPATIENT)
Dept: GASTROENTEROLOGY | Facility: CLINIC | Age: 46
End: 2021-07-07

## 2021-07-07 NOTE — TELEPHONE ENCOUNTER
----- Message from Clarisse Moore sent at 7/6/2021 10:25 AM EDT -----  Regarding: Non-Urgent Medical Question  Contact: [phone]  Dr. Damon,    Over the last couple of days I've noticed light bruising on my legs and tops of my feet.  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  Is this a side effect from the Endocort?  The bruising is very light and not painful but wanted you to be aware in case it is cause for concern.  Thanks, Clarisse Moore

## 2021-07-08 NOTE — TELEPHONE ENCOUNTER
Easy bruising not usually of side effect of Entocort.  There is a rash that we sometimes see that looks like bruising that can be seen with Crohn's and ulcerative colitis.  Please have her take a picture of it with her phone in case it gets better and I will look at her legs and feet next week for follow-up

## 2021-07-15 ENCOUNTER — OFFICE VISIT (OUTPATIENT)
Dept: GASTROENTEROLOGY | Facility: CLINIC | Age: 46
End: 2021-07-15

## 2021-07-15 VITALS — BODY MASS INDEX: 22.66 KG/M2 | HEIGHT: 69 IN | WEIGHT: 153 LBS | TEMPERATURE: 98.2 F

## 2021-07-15 DIAGNOSIS — K50.00 CROHN'S DISEASE OF SMALL INTESTINE WITHOUT COMPLICATION (HCC): Primary | ICD-10-CM

## 2021-07-15 PROCEDURE — 99214 OFFICE O/P EST MOD 30 MIN: CPT | Performed by: INTERNAL MEDICINE

## 2021-07-15 NOTE — PROGRESS NOTES
Subjective   Chief Complaint   Patient presents with   • Follow-up       Clarisse Moore is a  46 y.o. female here for a follow up visit for Crohn's disease of the small intestine.     Patient was recently diagnosed with Crohn's disease of the small intestine after colonoscopy on 2021 showed ileitis.  She had complained of some burning lower abdominal pain prior to that colonoscopy.  She did CT enterography that showed mild mucosal enhancement of the terminal ileum with no transmural inflammation or surrounding inflammatory change or complication.  She was started on budesonide 9 mg a day.  She has been doing well.  She feels better overall.  The pain that she was experiencing has resolved.  She did have some mild discomfort and some diarrhea after eating some black bean tacos last week but otherwise she is had no issues.  She reports that she gets somewhat fatigued in the late afternoon but she is not sleeping as well.    We discussed biologic therapy as well as other medications for Crohn's.  She has concerns regarding Biologics due to an aunt who was on Humira for many years for rheumatoid arthritis who has now developed lymphoma.  HPI  Past Medical History:   Diagnosis Date   • Abdominal pain     LEFT   • Constipation    • Depression    • Environmental allergies    • Fear of flying    • PONV (postoperative nausea and vomiting)      Past Surgical History:   Procedure Laterality Date   •  SECTION     • COLONOSCOPY N/A 2021    Procedure: COLONOSCOPY TO CECUM AND INTO TERMINAL ILEUM WITH HOT AND COLD SNARE POLYPECTOMY AND COLD BIOPSIES;  Surgeon: Ade Damon MD;  Location: [facility] ENDOSCOPY;  Service: Gastroenterology;  Laterality: N/A;  PRE: LLQ PAIN, RECTAL BLEEDING  POST: POLYPS, HEMORRHOIDS, ILEITIS   • HYSTERECTOMY      partial    • MOUTH SURGERY         Current Outpatient Medications:   •  azelastine (ASTELIN) 0.1 % nasal spray, 1 spray into the nostril(s) as directed by provider Daily.,  Disp: , Rfl:   •  Budesonide (ENTOCORT EC) 3 MG 24 hr capsule, Take 3 capsules by mouth Daily for 90 days., Disp: 90 capsule, Rfl: 3  •  loratadine (CLARITIN) 10 MG tablet, Take 10 mg by mouth daily., Disp: , Rfl:   •  VITAMIN D, CHOLECALCIFEROL, PO, Take 50,000 Units by mouth Daily., Disp: , Rfl:   PRN Meds:.  Allergies   Allergen Reactions   • Codeine GI Intolerance     Social History     Socioeconomic History   • Marital status:      Spouse name: Not on file   • Number of children: Not on file   • Years of education: Not on file   • Highest education level: Not on file   Tobacco Use   • Smoking status: Former Smoker     Types: Cigarettes     Start date: 1997     Quit date:      Years since quittin.5   • Smokeless tobacco: Never Used   Vaping Use   • Vaping Use: Never used   Substance and Sexual Activity   • Alcohol use: Yes     Alcohol/week: 5.0 standard drinks     Types: 5 Glasses of wine per week   • Drug use: Never   • Sexual activity: Defer     Family History   Problem Relation Age of Onset   • Breast cancer Paternal Aunt    • Celiac disease Paternal Aunt    • Heart disease Paternal Grandmother    • Lung cancer Paternal Grandmother    • Colon cancer Paternal Grandfather    • Celiac disease Father    • Malig Hyperthermia Neg Hx      Review of Systems   Constitutional: Negative for appetite change and unexpected weight change.   Eyes: Negative.    Gastrointestinal: Negative for abdominal pain and blood in stool.   Musculoskeletal: Negative.    Skin: Negative.    Psychiatric/Behavioral: Positive for sleep disturbance.     Vitals:    07/15/21 1204   Temp: 98.2 °F (36.8 °C)         07/15/21  1204   Weight: 69.4 kg (153 lb)       Objective   Physical Exam  Constitutional:       Appearance: Normal appearance.   Abdominal:      General: There is no distension.   Neurological:      Mental Status: She is alert.       No radiology results for the last 7 days    Assessment/Plan   Diagnoses and all  orders for this visit:    Crohn's disease of small intestine without complication (CMS/HCC)  -     Vitamin B12; Future  -     CBC & Differential; Future  -     Comprehensive Metabolic Panel; Future  -     Calprotectin, Fecal - Stool, Per Rectum; Future      Plan:  · Continue budesonide for now and we discussed the long-term risks of chronic steroid use  · We will plan on checking fecal calprotectin in the fall to assess inflammation and follow.  · She has borderline low on [id number] oral supplementation for now we will recheck her levels in the fall as well.  May need B12 injections  · Advised patient to get flu shot when available

## 2021-08-25 ENCOUNTER — APPOINTMENT (OUTPATIENT)
Dept: WOMENS IMAGING | Facility: HOSPITAL | Age: 46
End: 2021-08-25

## 2021-08-25 PROCEDURE — 77063 BREAST TOMOSYNTHESIS BI: CPT | Performed by: RADIOLOGY

## 2021-08-25 PROCEDURE — 77067 SCR MAMMO BI INCL CAD: CPT | Performed by: RADIOLOGY

## 2021-10-01 RX ORDER — BUDESONIDE 3 MG/1
CAPSULE, COATED PELLETS ORAL
Qty: 90 CAPSULE | Refills: 3 | Status: SHIPPED | OUTPATIENT
Start: 2021-10-01 | End: 2022-03-23

## 2021-10-04 ENCOUNTER — LAB (OUTPATIENT)
Dept: LAB | Facility: HOSPITAL | Age: 46
End: 2021-10-04

## 2021-10-04 DIAGNOSIS — K50.00 CROHN'S DISEASE OF SMALL INTESTINE WITHOUT COMPLICATION (HCC): ICD-10-CM

## 2021-10-04 PROCEDURE — 82607 VITAMIN B-12: CPT | Performed by: INTERNAL MEDICINE

## 2021-10-04 PROCEDURE — 85025 COMPLETE CBC W/AUTO DIFF WBC: CPT | Performed by: INTERNAL MEDICINE

## 2021-10-04 PROCEDURE — 83993 ASSAY FOR CALPROTECTIN FECAL: CPT

## 2021-10-04 PROCEDURE — 80053 COMPREHEN METABOLIC PANEL: CPT | Performed by: INTERNAL MEDICINE

## 2021-10-06 ENCOUNTER — LAB (OUTPATIENT)
Dept: GASTROENTEROLOGY | Facility: CLINIC | Age: 46
End: 2021-10-06

## 2021-10-06 LAB — CALPROTECTIN STL-MCNT: 75 UG/G (ref 0–120)

## 2021-10-11 NOTE — PROGRESS NOTES
Stool study showed no significant elevation fecal calprotectin which is good.    All labs look good-normal    Continue current medication-office follow-up in 6 months

## 2021-10-12 ENCOUNTER — TELEPHONE (OUTPATIENT)
Dept: GASTROENTEROLOGY | Facility: CLINIC | Age: 46
End: 2021-10-12

## 2021-10-12 NOTE — TELEPHONE ENCOUNTER
Call to pt.  Advise per DR Damon note.  Verb understanding.      Asking if ok to be on budesonide ongoing - started in July.  Advise Dr Damon out of office today - will send question.  Verb understanding - will await reply.      It is noted that pt has f/u appt with DR Damon 12/31/21.

## 2021-10-12 NOTE — TELEPHONE ENCOUNTER
----- Message from Ade Damon MD sent at 10/11/2021  4:18 PM EDT -----  Stool study showed no significant elevation fecal calprotectin which is good.    All labs look good-normal    Continue current medication-office follow-up in 6 months

## 2021-10-13 NOTE — TELEPHONE ENCOUNTER
Ok to continue budesonide for now - we can discuss risk/benefit at f/i in dec - I have no concern about her continuing this for now

## 2021-10-25 ENCOUNTER — TELEPHONE (OUTPATIENT)
Dept: GASTROENTEROLOGY | Facility: CLINIC | Age: 46
End: 2021-10-25

## 2021-10-25 NOTE — TELEPHONE ENCOUNTER
----- Message from Clarisse Moore sent at 10/25/2021  3:01 PM EDT -----  Regarding: Vomiting and nausea   Dr. Damon,   I went camping last weekend and on the first night I felt nauseous around 9pm and switched from vomiting to diarrhea for approximately 3 hours.  The nausea would not go away, so my  took me to the ER outside of [place], [place]. They gave me zofran and 15 minutes later, I was completely fine and able to enjoy the rest of the weekend.  A similar episode happened back in June, but I had eaten a salad at a fast food restaurant and assumed it was food poisoning. In both cases, I had a salad with really strong raw onions.  I saw there was a recall on onions last week.  Anyway, could this be a symptom of my crohns and my body not able to tolerate certain foods, like raw onions now?  Coincidence with the salad, maybe I was exposed to listeria and/or salmonella both times?  I didn’t think it was the budesonide because I’ve only had these two occurrences, 4 months apart.  Is it possible to get a prescription for zofran or something like it in case it comes up again?  Both times it was the   worst I’ve ever felt, even after labor and hysterectomy.  I’d like to never have to go through that again if we can help it.  Thanks for your help, Clarisse Moore

## 2021-10-28 RX ORDER — ONDANSETRON 4 MG/1
4 TABLET, FILM COATED ORAL EVERY 8 HOURS PRN
Qty: 20 TABLET | Refills: 3 | Status: SHIPPED | OUTPATIENT
Start: 2021-10-28 | End: 2023-02-24

## 2021-10-28 NOTE — TELEPHONE ENCOUNTER
zofran sent to pharmacy    Hard to say for sure the cause - could be an infectious process.  Doubt specific food but with crohn's of the small intestine, eating too much ruffage can cause bowel obstruction.  She had only minimal narrowing on 5/21 CT - will have to see how this goes unfortuntately - hopefully will not recur but if does, pls let me know

## 2021-11-08 ENCOUNTER — TELEPHONE (OUTPATIENT)
Dept: GASTROENTEROLOGY | Facility: CLINIC | Age: 46
End: 2021-11-08

## 2021-11-16 ENCOUNTER — OFFICE VISIT (OUTPATIENT)
Dept: FAMILY MEDICINE CLINIC | Facility: CLINIC | Age: 46
End: 2021-11-16

## 2021-11-16 VITALS
WEIGHT: 150 LBS | OXYGEN SATURATION: 99 % | RESPIRATION RATE: 16 BRPM | DIASTOLIC BLOOD PRESSURE: 50 MMHG | BODY MASS INDEX: 22.15 KG/M2 | SYSTOLIC BLOOD PRESSURE: 96 MMHG | HEART RATE: 83 BPM

## 2021-11-16 DIAGNOSIS — E55.9 VITAMIN D DEFICIENCY: ICD-10-CM

## 2021-11-16 DIAGNOSIS — K50.919 CROHN'S DISEASE WITH COMPLICATION, UNSPECIFIED GASTROINTESTINAL TRACT LOCATION (HCC): ICD-10-CM

## 2021-11-16 DIAGNOSIS — E04.9 ENLARGED THYROID: ICD-10-CM

## 2021-11-16 DIAGNOSIS — Z00.00 PHYSICAL EXAM: ICD-10-CM

## 2021-11-16 DIAGNOSIS — Z01.89 ROUTINE LAB DRAW: Primary | ICD-10-CM

## 2021-11-16 PROCEDURE — 99396 PREV VISIT EST AGE 40-64: CPT | Performed by: NURSE PRACTITIONER

## 2021-11-16 NOTE — PROGRESS NOTES
Betty Moore is a 46 y.o. female.   PMHX:Recent diagnosis of Crohns..Being followed by GI and is currently taking Budesonide.  Seasonal allergies and is doing well.  PSHX:Hysterectomy,colonoscopy and c section  PFHX:Father has celiac.  Exercise:Yoga  Diet:Is on a carbohydrate diet   Sleep hygiene:mediocre sleep hygien  Employment status: and works for her [employer] and is followed by Dr Astorga    History of Present Illness   See above  The following portions of the patient's history were reviewed and updated as appropriate: allergies, current medications, past family history, past medical history, past social history, past surgical history and problem list.    Review of Systems   Constitutional: Negative for activity change, appetite change, chills and fever.   HENT: Negative for congestion.    Eyes: Negative for pain.   Respiratory: Negative for cough and shortness of breath.    Cardiovascular: Negative for chest pain and leg swelling.   Gastrointestinal: Negative for nausea and vomiting.   Genitourinary: Negative for difficulty urinating.   Skin: Negative for rash.   Neurological: Negative for dizziness, facial asymmetry and headache.       Objective   Physical Exam  Vitals and nursing note reviewed.   Constitutional:       General: She is not in acute distress.     Appearance: Normal appearance. She is well-developed.   HENT:      Head: Normocephalic and atraumatic.      Right Ear: External ear normal.      Left Ear: External ear normal.   Neck:      Thyroid: No thyromegaly.   Cardiovascular:      Rate and Rhythm: Normal rate and regular rhythm.      Heart sounds: Normal heart sounds.   Pulmonary:      Effort: Pulmonary effort is normal.      Breath sounds: Normal breath sounds.   Musculoskeletal:         General: Normal range of motion.      Cervical back: Neck supple.   Skin:     General: Skin is warm.   Neurological:      Mental Status: She is alert.            Assessment/Plan   Diagnoses and all orders for this visit:    1. Routine lab draw (Primary)  -     Comprehensive Metabolic Panel  -     Lipid Panel With LDL / HDL Ratio    2. Vitamin D deficiency  -     Vitamin D 25 Hydroxy    3. Enlarged thyroid  -     TSH    4. Crohn's disease with complication, unspecified gastrointestinal tract location (HCC)    5. Physical exam      Preventative counseling for diet and exercise with patient at visit.  Pt reports that they wear their seatbelt regularly.   Continue follow up with Dr Marisol ALFORD.  Will call with lab results

## 2021-11-17 LAB
25(OH)D3+25(OH)D2 SERPL-MCNC: 38.3 NG/ML (ref 30–100)
ALBUMIN SERPL-MCNC: 4.2 G/DL (ref 3.8–4.8)
ALBUMIN/GLOB SERPL: 2.2 {RATIO} (ref 1.2–2.2)
ALP SERPL-CCNC: 45 IU/L (ref 44–121)
ALT SERPL-CCNC: 7 IU/L (ref 0–32)
AST SERPL-CCNC: 10 IU/L (ref 0–40)
BILIRUB SERPL-MCNC: 0.4 MG/DL (ref 0–1.2)
BUN SERPL-MCNC: 10 MG/DL (ref 6–24)
BUN/CREAT SERPL: 14 (ref 9–23)
CALCIUM SERPL-MCNC: 9.1 MG/DL (ref 8.7–10.2)
CHLORIDE SERPL-SCNC: 104 MMOL/L (ref 96–106)
CHOLEST SERPL-MCNC: 156 MG/DL (ref 100–199)
CO2 SERPL-SCNC: 24 MMOL/L (ref 20–29)
CREAT SERPL-MCNC: 0.7 MG/DL (ref 0.57–1)
GLOBULIN SER CALC-MCNC: 1.9 G/DL (ref 1.5–4.5)
GLUCOSE SERPL-MCNC: 87 MG/DL (ref 65–99)
HDLC SERPL-MCNC: 53 MG/DL
LDLC SERPL CALC-MCNC: 85 MG/DL (ref 0–99)
LDLC/HDLC SERPL: 1.6 RATIO (ref 0–3.2)
POTASSIUM SERPL-SCNC: 4.5 MMOL/L (ref 3.5–5.2)
PROT SERPL-MCNC: 6.1 G/DL (ref 6–8.5)
SODIUM SERPL-SCNC: 140 MMOL/L (ref 134–144)
TRIGL SERPL-MCNC: 100 MG/DL (ref 0–149)
TSH SERPL DL<=0.005 MIU/L-ACNC: 0.9 UIU/ML (ref 0.45–4.5)
VLDLC SERPL CALC-MCNC: 18 MG/DL (ref 5–40)

## 2021-12-13 ENCOUNTER — OFFICE VISIT (OUTPATIENT)
Dept: GASTROENTEROLOGY | Facility: CLINIC | Age: 46
End: 2021-12-13

## 2021-12-13 VITALS — HEIGHT: 69 IN | BODY MASS INDEX: 23.25 KG/M2 | TEMPERATURE: 97.4 F | WEIGHT: 157 LBS

## 2021-12-13 DIAGNOSIS — K50.00 CROHN'S DISEASE OF SMALL INTESTINE WITHOUT COMPLICATION (HCC): Primary | ICD-10-CM

## 2021-12-13 PROCEDURE — 99214 OFFICE O/P EST MOD 30 MIN: CPT | Performed by: INTERNAL MEDICINE

## 2021-12-13 NOTE — PROGRESS NOTES
Subjective   Chief Complaint   Patient presents with   • Crohn's Disease       Clarisse Moore is a  46 y.o. female here for a follow up visit for Crohn's disease of the terminal ileum.     Patient was diagnosed with Crohn's disease of the small intestine after colonoscopy on 2021 showed ileitis.  She had complained of some burning lower abdominal pain prior to that colonoscopy.  She did CT enterography that showed mild mucosal enhancement of the terminal ileum with no transmural inflammation or surrounding inflammatory change or complication.  Fecal calprotectin in 2021 was normal.    She has been following an specific carbohydrate diet at the advice of a holistic practitioner.  She has variation in her stools from mushy to constipated.  No abdominal pain - no blood in stool.    She is on subliingual b12.      HPI  Past Medical History:   Diagnosis Date   • Abdominal pain     LEFT   • Constipation    • Depression    • Environmental allergies    • Fear of flying    • PONV (postoperative nausea and vomiting)      Past Surgical History:   Procedure Laterality Date   •  SECTION     • COLONOSCOPY N/A 2021    Procedure: COLONOSCOPY TO CECUM AND INTO TERMINAL ILEUM WITH HOT AND COLD SNARE POLYPECTOMY AND COLD BIOPSIES;  Surgeon: Ade Damon MD;  Location: [facility] ENDOSCOPY;  Service: Gastroenterology;  Laterality: N/A;  PRE: LLQ PAIN, RECTAL BLEEDING  POST: POLYPS, HEMORRHOIDS, ILEITIS   • HYSTERECTOMY      partial    • MOUTH SURGERY         Current Outpatient Medications:   •  azelastine (ASTELIN) 0.1 % nasal spray, 1 spray into the nostril(s) as directed by provider Daily., Disp: , Rfl:   •  Budesonide (ENTOCORT EC) 3 MG 24 hr capsule, TAKE 3 CAPSULES BY MOUTH DAILY, Disp: 90 capsule, Rfl: 3  •  loratadine (CLARITIN) 10 MG tablet, Take 10 mg by mouth daily., Disp: , Rfl:   •  VITAMIN D, CHOLECALCIFEROL, PO, Take 50,000 Units by mouth Daily., Disp: , Rfl:   •  ondansetron (Zofran) 4 MG tablet,  Take 1 tablet by mouth Every 8 (Eight) Hours As Needed for Nausea or Vomiting., Disp: 20 tablet, Rfl: 3  PRN Meds:.  Allergies   Allergen Reactions   • Codeine GI Intolerance     Social History     Socioeconomic History   • Marital status:    Tobacco Use   • Smoking status: Former Smoker     Types: Cigarettes     Start date: 1997     Quit date:      Years since quittin.9   • Smokeless tobacco: Never Used   Vaping Use   • Vaping Use: Never used   Substance and Sexual Activity   • Alcohol use: Yes     Alcohol/week: 5.0 standard drinks     Types: 5 Glasses of wine per week   • Drug use: Never   • Sexual activity: Defer     Family History   Problem Relation Age of Onset   • Breast cancer Paternal Aunt    • Celiac disease Paternal Aunt    • Heart disease Paternal Grandmother    • Lung cancer Paternal Grandmother    • Colon cancer Paternal Grandfather    • Celiac disease Father    • Malig Hyperthermia Neg Hx      Review of Systems   Constitutional: Negative for appetite change and unexpected weight change.   Gastrointestinal: Positive for constipation. Negative for abdominal pain and blood in stool.   All other systems reviewed and are negative.    Vitals:    21 1156   Temp: 97.4 °F (36.3 °C)         21  1156   Weight: 71.2 kg (157 lb)       Objective   Physical Exam  Constitutional:       Appearance: She is well-developed.   HENT:      Head: Normocephalic and atraumatic.   Eyes:      General: No scleral icterus.  Abdominal:      General: There is no distension.      Palpations: Abdomen is soft. There is no mass.   Skin:     General: Skin is warm and dry.   Neurological:      Mental Status: She is alert.       No radiology results for the last 7 days    Assessment/Plan   Diagnoses and all orders for this visit:    Crohn's disease of small intestine without complication (HCC)  -     Calprotectin, Fecal - Stool, Per Rectum; Future      Plan:  · She is doing well and is completely asymptomatic  on 9 mg of Entocort daily. Recent fecal calprotectin was normal. She wants to discontinue her medication and see how she does. We discussed the natural history of Crohn's and the likelihood that she would require medication and that it is possible that her disease could worsen off medication. We also discussed other modalities of long-term therapy and the risks of ongoing steroid use. For now she wishes to try diet lifestyle to manage her condition. Recommend repeating fecal calprotectin in 6 months to reassess. She will contact me if she is symptomatic prior to that. We discussed how to wean Entocort.

## 2021-12-20 ENCOUNTER — TELEPHONE (OUTPATIENT)
Dept: GASTROENTEROLOGY | Facility: CLINIC | Age: 46
End: 2021-12-20

## 2021-12-20 NOTE — TELEPHONE ENCOUNTER
"Overdue alert received for fecal calprotectin.     Call to pt . States understood would repeat in the future.  See o/v note of 12/13: \"Recommend repeating fecal calprotectin in 6 months to reassess\"    Pt states will put this on her calendar.  Has stool kit.     "

## 2022-03-23 ENCOUNTER — OFFICE VISIT (OUTPATIENT)
Dept: FAMILY MEDICINE CLINIC | Facility: CLINIC | Age: 47
End: 2022-03-23

## 2022-03-23 VITALS
BODY MASS INDEX: 22.36 KG/M2 | HEART RATE: 64 BPM | OXYGEN SATURATION: 99 % | HEIGHT: 69 IN | SYSTOLIC BLOOD PRESSURE: 122 MMHG | WEIGHT: 151 LBS | RESPIRATION RATE: 12 BRPM | DIASTOLIC BLOOD PRESSURE: 58 MMHG

## 2022-03-23 DIAGNOSIS — F40.243 ANXIETY WITH FLYING: Primary | ICD-10-CM

## 2022-03-23 PROCEDURE — 99213 OFFICE O/P EST LOW 20 MIN: CPT | Performed by: NURSE PRACTITIONER

## 2022-03-23 RX ORDER — FLUTICASONE PROPIONATE 50 MCG
2 SPRAY, SUSPENSION (ML) NASAL DAILY
COMMUNITY
End: 2022-12-08

## 2022-03-23 RX ORDER — ALPRAZOLAM 0.5 MG/1
0.5 TABLET ORAL 2 TIMES DAILY PRN
Qty: 30 TABLET | Refills: 0 | Status: SHIPPED | OUTPATIENT
Start: 2022-03-23 | End: 2023-02-24

## 2022-03-23 NOTE — PATIENT INSTRUCTIONS
"[email or web address]">   Generalized Anxiety Disorder, Adult  Generalized anxiety disorder (WILLIAM) is a mental health condition. Unlike normal worries, anxiety related to WILLIAM is not triggered by a specific event. These worries do not fade or get better with time. WILLIAM interferes with relationships, work, and school.  WILLIAM symptoms can vary from mild to severe. People with severe WILLIAM can have intense waves of anxiety with physical symptoms that are similar to panic attacks.  What are the causes?  The exact cause of WILLIAM is not known, but the following are believed to have an impact:  Differences in natural brain chemicals.  Genes passed down from parents to children.  Differences in the way threats are perceived.  Development during childhood.  Personality.  What increases the risk?  The following factors may make you more likely to develop this condition:  Being female.  Having a family history of anxiety disorders.  Being very shy.  Experiencing very stressful life events, such as the death of a loved one.  Having a very stressful family environment.  What are the signs or symptoms?  People with WILLIAM often worry excessively about many things in their lives, such as their health and family. Symptoms may also include:  Mental and emotional symptoms:  Worrying excessively about natural disasters.  Fear of being late.  Difficulty concentrating.  Fears that others are judging your performance.  Physical symptoms:  Fatigue.  Headaches, muscle tension, muscle twitches, trembling, or feeling shaky.  Feeling like your heart is pounding or beating very fast.  Feeling out of breath or like you cannot take a deep breath.  Having trouble falling asleep or staying asleep, or experiencing restlessness.  Sweating.  Nausea, diarrhea, or irritable bowel syndrome (IBS).  Behavioral symptoms:  Experiencing erratic moods or irritability.  Avoidance of new situations.  Avoidance of people.  Extreme difficulty making decisions.  How is this " diagnosed?  This condition is diagnosed based on your symptoms and medical history. You will also have a physical exam. Your health care provider may perform tests to rule out other possible causes of your symptoms.  To be diagnosed with WILLIAM, a person must have anxiety that:  Is out of his or her control.  Affects several different aspects of his or her life, such as work and relationships.  Causes distress that makes him or her unable to take part in normal activities.  Includes at least three symptoms of WILLIAM, such as restlessness, fatigue, trouble concentrating, irritability, muscle tension, or sleep problems.  Before your health care provider can confirm a diagnosis of WILLIAM, these symptoms must be present more days than they are not, and they must last for 6 months or longer.  How is this treated?  This condition may be treated with:  Medicine. Antidepressant medicine is usually prescribed for long-term daily control. Anti-anxiety medicines may be added in severe cases, especially when panic attacks occur.  Talk therapy (psychotherapy). Certain types of talk therapy can be helpful in treating WILLIAM by providing support, education, and guidance. Options include:  Cognitive behavioral therapy (CBT). People learn coping skills and self-calming techniques to ease their physical symptoms. They learn to identify unrealistic thoughts and behaviors and to replace them with more appropriate thoughts and behaviors.  Acceptance and commitment therapy (ACT). This treatment teaches people how to be mindful as a way to cope with unwanted thoughts and feelings.  Biofeedback. This process trains you to manage your body's response (physiological response) through breathing techniques and relaxation methods. You will work with a therapist while machines are used to monitor your physical symptoms.  Stress management techniques. These include yoga, meditation, and exercise.  A mental health specialist can help determine which treatment  is best for you. Some people see improvement with one type of therapy. However, other people require a combination of therapies.  Follow these instructions at home:  Lifestyle  Maintain a consistent routine and schedule.  Anticipate stressful situations. Create a plan, and allow extra time to work with your plan.  Practice stress management or self-calming techniques that you have learned from your therapist or your health care provider.  General instructions  Take over-the-counter and prescription medicines only as told by your health care provider.  Understand that you are likely to have setbacks. Accept this and be kind to yourself as you persist to take better care of yourself.  Recognize and accept your accomplishments, even if you  them as small.  Keep all follow-up visits as told by your health care provider. This is important.  Contact a health care provider if:  Your symptoms do not get better.  Your symptoms get worse.  You have signs of depression, such as:  A persistently sad or irritable mood.  Loss of enjoyment in activities that used to bring you emily.  Change in weight or eating.  Changes in sleeping habits.  Avoiding friends or family members.  Loss of energy for normal tasks.  Feelings of guilt or worthlessness.  Get help right away if:  You have serious thoughts about hurting yourself or others.  If you ever feel like you may hurt yourself or others, or have thoughts about taking your own life, get help right away. Go to your nearest emergency department or:  Call your local emergency services (979 in the [place]  Call a suicide crisis helpline, such as the National Suicide Prevention Lifeline at 1-187.292.1045. This is open 24 hours a day in the [place]  Text the Crisis Text Line at 521493 (in the [place]  Summary  Generalized anxiety disorder (WILLIAM) is a mental health condition that involves worry that is not triggered by a specific event.  People with WILLIAM often worry excessively about many things  in their lives, such as their health and family.  WILLIAM may cause symptoms such as restlessness, trouble concentrating, sleep problems, frequent sweating, nausea, diarrhea, headaches, and trembling or muscle twitching.  A mental health specialist can help determine which treatment is best for you. Some people see improvement with one type of therapy. However, other people require a combination of therapies.  This information is not intended to replace advice given to you by your health care provider. Make sure you discuss any questions you have with your health care provider.  Document Revised: 10/07/2020 Document Reviewed: 10/07/2020  Elsevier Patient Education © 2021 [employer]

## 2022-03-23 NOTE — PROGRESS NOTES
Subjective   Clarisse Moore is a 47 y.o. female.   Anxiety    History of Present Illness   Anxiety when she flies and has taken xanax and something else in the past. She does well but cannot remember exactly what the other medicine is.    The following portions of the patient's history were reviewed and updated as appropriate: allergies, current medications, past family history, past medical history, past social history, past surgical history and problem list.    Review of Systems   Constitutional: Negative for activity change and appetite change.   Neurological: Negative for dizziness.   Psychiatric/Behavioral: Negative for stress. The patient is nervous/anxious.        Objective   Physical Exam  Vitals and nursing note reviewed.   Constitutional:       Appearance: Normal appearance.   HENT:      Head: Normocephalic and atraumatic.   Neurological:      Mental Status: She is alert.   Psychiatric:         Attention and Perception: Attention normal.         Mood and Affect: Mood is anxious.           Assessment/Plan   Problem List Items Addressed This Visit    None     Visit Diagnoses     Anxiety with flying    -  Primary    Relevant Medications    ALPRAZolam (Xanax) 0.5 MG tablet        Charan moore  Controlled substance agreement placed in chart and discussed with pt.       No follow-ups on file.

## 2022-06-21 ENCOUNTER — TELEPHONE (OUTPATIENT)
Dept: GASTROENTEROLOGY | Facility: CLINIC | Age: 47
End: 2022-06-21

## 2022-06-21 NOTE — TELEPHONE ENCOUNTER
Caller: WAYNE RODRÍGUEZ    Relationship to patient: SELF    Best call back number: [phone]    Chief complaint: PT CALLED TO SCHEDULE FOLLOW UP APPT WITH MILADYS WATSON. SHE ALSO SAID THAT DR. WATSON REQUESTED LABS 6 MONTHS AFTER HER LAST FOLLOW UP.     Type of visit: LABS    Requested date: ANYTIME IN JULY BESIDES 1ST AND 29TH    Additional notes:GOT PT SCHEDULED FOR FOLLOW UP APPT, JUST NEEDED LABS TO BE SCHEDULED.

## 2022-07-05 ENCOUNTER — TELEPHONE (OUTPATIENT)
Dept: GASTROENTEROLOGY | Facility: CLINIC | Age: 47
End: 2022-07-05

## 2022-07-05 NOTE — TELEPHONE ENCOUNTER
Caller: Clarisse Moore    Relationship: Self    Best call back number: [phone]    What is the best time to reach you: ANYTIME    Who are you requesting to speak with (clinical staff, provider,  specific staff member): NURSE    What was the call regarding: PATIENT WAS INFORMED BACK December 20,2021 LABS TO BE PERFORMED WITHIN 6 MONTHS FROM THAT DATE.  PATIENT HAS THE KIT TO DO THE STOOL SAMPLE BUT DIDN'T WANT TO DROP OFF RANDOMLY WANTED THE OFFICE TO BE AWARE.    Do you require a callback: YES.

## 2022-08-30 ENCOUNTER — APPOINTMENT (OUTPATIENT)
Dept: WOMENS IMAGING | Facility: HOSPITAL | Age: 47
End: 2022-08-30

## 2022-08-30 PROCEDURE — 77063 BREAST TOMOSYNTHESIS BI: CPT | Performed by: RADIOLOGY

## 2022-08-30 PROCEDURE — 77067 SCR MAMMO BI INCL CAD: CPT | Performed by: RADIOLOGY

## 2022-12-08 ENCOUNTER — OFFICE VISIT (OUTPATIENT)
Dept: GASTROENTEROLOGY | Facility: CLINIC | Age: 47
End: 2022-12-08

## 2022-12-08 VITALS
HEART RATE: 99 BPM | TEMPERATURE: 96.9 F | WEIGHT: 142.9 LBS | SYSTOLIC BLOOD PRESSURE: 107 MMHG | DIASTOLIC BLOOD PRESSURE: 68 MMHG | HEIGHT: 69 IN | BODY MASS INDEX: 21.17 KG/M2

## 2022-12-08 DIAGNOSIS — K50.00 CROHN'S DISEASE OF SMALL INTESTINE WITHOUT COMPLICATION: Primary | ICD-10-CM

## 2022-12-08 LAB
25(OH)D3+25(OH)D2 SERPL-MCNC: 37.6 NG/ML (ref 30–100)
ALBUMIN SERPL-MCNC: 4.5 G/DL (ref 3.5–5.2)
ALBUMIN/GLOB SERPL: 2.6 G/DL
ALP SERPL-CCNC: 39 U/L (ref 39–117)
ALT SERPL-CCNC: 9 U/L (ref 1–33)
AST SERPL-CCNC: 9 U/L (ref 1–32)
BASOPHILS # BLD AUTO: 0.07 10*3/MM3 (ref 0–0.2)
BASOPHILS NFR BLD AUTO: 1.2 % (ref 0–1.5)
BILIRUB SERPL-MCNC: 0.5 MG/DL (ref 0–1.2)
BUN SERPL-MCNC: 12 MG/DL (ref 6–20)
BUN/CREAT SERPL: 16.4 (ref 7–25)
CALCIUM SERPL-MCNC: 9.4 MG/DL (ref 8.6–10.5)
CHLORIDE SERPL-SCNC: 104 MMOL/L (ref 98–107)
CO2 SERPL-SCNC: 26.5 MMOL/L (ref 22–29)
CREAT SERPL-MCNC: 0.73 MG/DL (ref 0.57–1)
EGFRCR SERPLBLD CKD-EPI 2021: 103.5 ML/MIN/1.73
EOSINOPHIL # BLD AUTO: 0.19 10*3/MM3 (ref 0–0.4)
EOSINOPHIL NFR BLD AUTO: 3.4 % (ref 0.3–6.2)
ERYTHROCYTE [DISTWIDTH] IN BLOOD BY AUTOMATED COUNT: 12.6 % (ref 12.3–15.4)
GLOBULIN SER CALC-MCNC: 1.7 GM/DL
GLUCOSE SERPL-MCNC: 90 MG/DL (ref 65–99)
HCT VFR BLD AUTO: 40 % (ref 34–46.6)
HGB BLD-MCNC: 13.2 G/DL (ref 12–15.9)
IMM GRANULOCYTES # BLD AUTO: 0.02 10*3/MM3 (ref 0–0.05)
IMM GRANULOCYTES NFR BLD AUTO: 0.4 % (ref 0–0.5)
LYMPHOCYTES # BLD AUTO: 1.52 10*3/MM3 (ref 0.7–3.1)
LYMPHOCYTES NFR BLD AUTO: 27 % (ref 19.6–45.3)
MCH RBC QN AUTO: 28.1 PG (ref 26.6–33)
MCHC RBC AUTO-ENTMCNC: 33 G/DL (ref 31.5–35.7)
MCV RBC AUTO: 85.3 FL (ref 79–97)
MONOCYTES # BLD AUTO: 0.49 10*3/MM3 (ref 0.1–0.9)
MONOCYTES NFR BLD AUTO: 8.7 % (ref 5–12)
NEUTROPHILS # BLD AUTO: 3.34 10*3/MM3 (ref 1.7–7)
NEUTROPHILS NFR BLD AUTO: 59.3 % (ref 42.7–76)
NRBC BLD AUTO-RTO: 0 /100 WBC (ref 0–0.2)
PLATELET # BLD AUTO: 274 10*3/MM3 (ref 140–450)
POTASSIUM SERPL-SCNC: 4.9 MMOL/L (ref 3.5–5.2)
PROT SERPL-MCNC: 6.2 G/DL (ref 6–8.5)
RBC # BLD AUTO: 4.69 10*6/MM3 (ref 3.77–5.28)
SODIUM SERPL-SCNC: 142 MMOL/L (ref 136–145)
VIT B12 SERPL-MCNC: 632 PG/ML (ref 211–946)
WBC # BLD AUTO: 5.63 10*3/MM3 (ref 3.4–10.8)

## 2022-12-08 PROCEDURE — 99213 OFFICE O/P EST LOW 20 MIN: CPT | Performed by: INTERNAL MEDICINE

## 2022-12-08 NOTE — PROGRESS NOTES
Subjective   Chief Complaint   Patient presents with   • Crohn's Disease       Clarisse Moore is a  47 y.o. female here for a follow up visit for Crohn's disease.     Patient was diagnosed with Crohn's disease of the small intestine after colonoscopy on 2021 showed ileitis.  She had complained of some burning lower abdominal pain prior to that colonoscopy.  She did CT enterography that showed mild mucosal enhancement of the terminal ileum with no transmural inflammation or surrounding inflammatory change or complication.  Fecal calprotectin in 2021 was normal.    At her last visit she indicated that she wished to discontinue her medication and see how she does with diet modification alone.  Prior to that she had been on budesonide.  Repeat fecal calprotectin is now 140.she denies any new symptoms, in fact she feels better than she has in some time.  She is having no diarrhea, abdominal pain is resolved and she denies bloating.  She is been following a very modified diet.  She has lost about 15 pounds over the past year.  She takes an oral B12 supplement.    She has been hesitant to take medication for Crohn's due to concern relating drug-related side effects.  Her sister-in-law who took Humira for years for rheumatoid arthritis  from lymphoma.  HPI  Past Medical History:   Diagnosis Date   • Abdominal pain     LEFT   • Colon polyp 2021   • Constipation    • Crohn's disease (HCC) 2021   • Depression    • Environmental allergies    • Fear of flying    • PONV (postoperative nausea and vomiting)    • Ulcer      Past Surgical History:   Procedure Laterality Date   •  SECTION     • COLONOSCOPY N/A 2021    Procedure: COLONOSCOPY TO CECUM AND INTO TERMINAL ILEUM WITH HOT AND COLD SNARE POLYPECTOMY AND COLD BIOPSIES;  Surgeon: Ade Damon MD;  Location: [facility] ENDOSCOPY;  Service: Gastroenterology;  Laterality: N/A;  PRE: LLQ PAIN, RECTAL BLEEDING  POST: POLYPS, HEMORRHOIDS,  ILEITIS   • HYSTERECTOMY      partial    • MOUTH SURGERY         Current Outpatient Medications:   •  Cyanocobalamin (VITAMIN B-12 PO), Take  by mouth., Disp: , Rfl:   •  loratadine (CLARITIN) 10 MG tablet, Take 10 mg by mouth daily., Disp: , Rfl:   •  ondansetron (Zofran) 4 MG tablet, Take 1 tablet by mouth Every 8 (Eight) Hours As Needed for Nausea or Vomiting., Disp: 20 tablet, Rfl: 3  •  VITAMIN D, CHOLECALCIFEROL, PO, Take 50,000 Units by mouth Daily., Disp: , Rfl:   •  ALPRAZolam (Xanax) 0.5 MG tablet, Take 1 tablet by mouth 2 (Two) Times a Day As Needed for Anxiety., Disp: 30 tablet, Rfl: 0  PRN Meds:.  Allergies   Allergen Reactions   • Codeine GI Intolerance     Social History     Socioeconomic History   • Marital status:    Tobacco Use   • Smoking status: Former     Packs/day: 1.00     Years: 10.00     Pack years: 10.00     Types: Cigarettes     Start date: 1997     Quit date: 2010     Years since quittin.9   • Smokeless tobacco: Never   Vaping Use   • Vaping Use: Never used   Substance and Sexual Activity   • Alcohol use: Yes     Alcohol/week: 2.0 standard drinks     Types: 1 Glasses of wine, 1 Drinks containing 0.5 oz of alcohol per week   • Drug use: Never   • Sexual activity: Yes     Partners: Male     Birth control/protection: Other     Family History   Problem Relation Age of Onset   • Breast cancer Paternal Aunt    • Celiac disease Paternal Aunt    • Heart disease Paternal Grandmother    • Lung cancer Paternal Grandmother    • Colon cancer Paternal Grandfather    • Celiac disease Father    • Malig Hyperthermia Neg Hx      Review of Systems   Constitutional: Negative for appetite change and unexpected weight change.   Gastrointestinal: Negative for abdominal pain, blood in stool and diarrhea.     Vitals:    22 0830   BP: 107/68   Pulse: 99   Temp: 96.9 °F (36.1 °C)         22   Weight: 64.8 kg (142 lb 14.4 oz)       Objective   Physical Exam  Constitutional:        Appearance: Normal appearance. She is well-developed.   HENT:      Head: Normocephalic and atraumatic.   Eyes:      General: No scleral icterus.     Conjunctiva/sclera: Conjunctivae normal.   Pulmonary:      Effort: Pulmonary effort is normal.   Abdominal:      General: There is no distension.      Palpations: Abdomen is soft.      Tenderness: There is no abdominal tenderness.   Musculoskeletal:      Cervical back: Normal range of motion and neck supple.   Skin:     General: Skin is warm and dry.   Neurological:      Mental Status: She is alert.   Psychiatric:         Mood and Affect: Mood normal.         Behavior: Behavior normal.       No radiology results for the last 7 days    Assessment & Plan   Diagnoses and all orders for this visit:    Crohn's disease of small intestine without complication (HCC)  -     CBC & Differential  -     Comprehensive Metabolic Panel  -     Vitamin B12  -     Vitamin D 25 Hydroxy  -     Calprotectin, Fecal - Stool, Per Rectum; Future      Plan:  · Asymptomatic at present on no medication.  Recent increase in fecal calprotectin noted and discussed with patient.  We discussed the long-term risks of untreated Crohn's including intestinal stricture and obstruction, risk of surgery.  She is currently asymptomatic at this point and wishes to continue to observe for now.  We will check routine labs today and repeat fecal calprotectin in 6 months.  If the level has increased significantly, will proceed with enterography.

## 2023-02-24 ENCOUNTER — OFFICE VISIT (OUTPATIENT)
Dept: FAMILY MEDICINE CLINIC | Facility: CLINIC | Age: 48
End: 2023-02-24
Payer: COMMERCIAL

## 2023-02-24 VITALS
WEIGHT: 141 LBS | DIASTOLIC BLOOD PRESSURE: 52 MMHG | BODY MASS INDEX: 20.88 KG/M2 | HEART RATE: 64 BPM | HEIGHT: 69 IN | SYSTOLIC BLOOD PRESSURE: 102 MMHG | OXYGEN SATURATION: 99 %

## 2023-02-24 DIAGNOSIS — Z13.228 ENCOUNTER FOR SCREENING FOR OTHER METABOLIC DISORDERS: ICD-10-CM

## 2023-02-24 DIAGNOSIS — Z00.00 ANNUAL PHYSICAL EXAM: Primary | ICD-10-CM

## 2023-02-24 DIAGNOSIS — Z11.59 NEED FOR HEPATITIS C SCREENING TEST: ICD-10-CM

## 2023-02-24 DIAGNOSIS — Z13.220 SCREENING FOR HYPERLIPIDEMIA: ICD-10-CM

## 2023-02-24 DIAGNOSIS — R94.6 ABNORMAL THYROID EXAM: ICD-10-CM

## 2023-02-24 PROBLEM — N92.1 MENOMETRORRHAGIA: Status: ACTIVE | Noted: 2019-04-29

## 2023-02-24 PROBLEM — R10.2 PELVIC PAIN IN FEMALE: Status: ACTIVE | Noted: 2019-04-29

## 2023-02-24 PROBLEM — D25.0 FIBROIDS, SUBMUCOSAL: Status: ACTIVE | Noted: 2019-04-29

## 2023-02-24 PROCEDURE — 99396 PREV VISIT EST AGE 40-64: CPT | Performed by: NURSE PRACTITIONER

## 2023-02-24 RX ORDER — PROGESTERONE 100 MG/1
CAPSULE ORAL
COMMUNITY
Start: 2023-01-03 | End: 2023-02-24

## 2023-02-24 RX ORDER — ESTRADIOL 0.03 MG/D
FILM, EXTENDED RELEASE TRANSDERMAL
COMMUNITY
Start: 2022-12-21 | End: 2023-02-24

## 2023-02-24 NOTE — PATIENT INSTRUCTIONS
Return in about 1 year (around 2/24/2024) for Annual, Labs.  Call with any questions or concerns.

## 2023-02-24 NOTE — PROGRESS NOTES
Preventive Exam    History of Present Illness: Clarisse Moore is a 47 y.o. here for check up and review of routine health maintenance. she states she is doing well and has no concerns.    Incidental finding of enlarged thyroid on right side. Non-tender with palpation, visible with examination.     Past medical history, surgical history and family history have been reviewed.     Review of Systems   Constitutional: Negative for appetite change, chills, fatigue, fever, unexpected weight gain and unexpected weight loss.   HENT: Negative for congestion, dental problem, sinus pressure, sore throat, trouble swallowing and voice change.         Dental exam is up to date.    Eyes: Negative.  Negative for blurred vision, double vision, photophobia and visual disturbance.        Wears contacts. Eye exam is up to date.    Respiratory: Negative for apnea, cough, chest tightness, shortness of breath and wheezing.    Cardiovascular: Negative for chest pain, palpitations and leg swelling.   Gastrointestinal: Positive for constipation. Negative for abdominal pain, diarrhea, nausea, vomiting and GERD.   Endocrine: Negative.  Negative for cold intolerance and heat intolerance.   Genitourinary: Negative.  Negative for breast discharge, breast lump, breast pain, dysuria, flank pain, menstrual problem, pelvic pain, pelvic pressure, urinary incontinence, vaginal bleeding and vaginal discharge.   Musculoskeletal: Negative for arthralgias, back pain, joint swelling and myalgias.   Skin: Negative.         Sees dermatology yearly   Allergic/Immunologic: Negative.  Negative for environmental allergies and food allergies.   Neurological: Negative for dizziness, speech difficulty, weakness, numbness and headache.   Hematological: Negative.  Does not bruise/bleed easily.   Psychiatric/Behavioral: Negative.  Negative for dysphoric mood, sleep disturbance, suicidal ideas, depressed mood and stress. The patient is not nervous/anxious.        PHYSICAL  EXAM    Vitals:    02/24/23 1424   BP: 102/52   Pulse: 64   SpO2: 99%     Body mass index is 20.82 kg/m².     Physical Exam  Vitals and nursing note reviewed.   Constitutional:       Appearance: Normal appearance. She is well-developed and normal weight.   HENT:      Head: Normocephalic and atraumatic.      Right Ear: Tympanic membrane, ear canal and external ear normal.      Left Ear: Tympanic membrane, ear canal and external ear normal.      Nose: Nose normal.      Mouth/Throat:      Lips: Pink.      Mouth: Mucous membranes are moist.      Tongue: No lesions.      Palate: No mass and lesions.      Pharynx: Oropharynx is clear. Uvula midline.      Tonsils: No tonsillar exudate.   Eyes:      Conjunctiva/sclera: Conjunctivae normal.      Pupils: Pupils are equal, round, and reactive to light.   Neck:      Thyroid: Thyromegaly present. No thyroid tenderness.   Cardiovascular:      Rate and Rhythm: Normal rate and regular rhythm.      Pulses: Normal pulses.           Dorsalis pedis pulses are 2+ on the right side and 2+ on the left side.        Posterior tibial pulses are 2+ on the right side and 2+ on the left side.      Heart sounds: Normal heart sounds. No murmur heard.  Pulmonary:      Effort: Pulmonary effort is normal.      Breath sounds: Normal breath sounds.   Abdominal:      General: Bowel sounds are normal. There is no distension.      Palpations: Abdomen is soft.      Tenderness: There is no abdominal tenderness.   Musculoskeletal:         General: No deformity. Normal range of motion.      Cervical back: Full passive range of motion without pain, normal range of motion and neck supple.      Right lower leg: No edema.      Left lower leg: No edema.   Lymphadenopathy:      Head:      Right side of head: No submental, submandibular, tonsillar, preauricular, posterior auricular or occipital adenopathy.      Left side of head: No submental, submandibular, tonsillar, preauricular, posterior auricular or occipital  adenopathy.      Cervical: No cervical adenopathy.      Right cervical: No superficial, deep or posterior cervical adenopathy.     Left cervical: No superficial, deep or posterior cervical adenopathy.      Upper Body:      Right upper body: No supraclavicular adenopathy.      Left upper body: No supraclavicular adenopathy.   Skin:     General: Skin is warm and dry.      Capillary Refill: Capillary refill takes 2 to 3 seconds.   Neurological:      General: No focal deficit present.      Mental Status: She is alert and oriented to person, place, and time.      Cranial Nerves: No cranial nerve deficit.      Sensory: Sensation is intact.      Motor: Motor function is intact.      Coordination: Coordination is intact.      Gait: Gait is intact.   Psychiatric:         Attention and Perception: Attention and perception normal.         Mood and Affect: Mood and affect normal.         Speech: Speech normal.         Behavior: Behavior normal. Behavior is cooperative.         Thought Content: Thought content normal.         Cognition and Memory: Cognition and memory normal.         Judgment: Judgment normal.         Procedures    Diagnoses and all orders for this visit:    1. Annual physical exam (Primary)    2. Screening for hyperlipidemia  -     Lipid Panel With / Chol / HDL Ratio    3. Need for hepatitis C screening test  -     Hepatitis C Antibody    4. Encounter for screening for other metabolic disorders  -     T3, Free  -     T4, Free  -     TSH  -     CBC & Differential  -     Comprehensive Metabolic Panel    5. Abnormal thyroid exam  -     Thyroglobulin Antibody  -     Thyroid Peroxidase Antibody  -     US Thyroid; Future        Problems Addressed this Visit    None  Visit Diagnoses     Annual physical exam    -  Primary    Screening for hyperlipidemia        Relevant Orders    Lipid Panel With / Chol / HDL Ratio    Need for hepatitis C screening test        Relevant Orders    Hepatitis C Antibody    Encounter for  screening for other metabolic disorders        Relevant Orders    T3, Free    T4, Free    TSH    CBC & Differential    Comprehensive Metabolic Panel    Abnormal thyroid exam        Relevant Orders    Thyroglobulin Antibody    Thyroid Peroxidase Antibody    US Thyroid      Diagnoses       Codes Comments    Annual physical exam    -  Primary ICD-10-CM: Z00.00  ICD-9-CM: V70.0     Screening for hyperlipidemia     ICD-10-CM: Z13.220  ICD-9-CM: V77.91     Need for hepatitis C screening test     ICD-10-CM: Z11.59  ICD-9-CM: V73.89     Encounter for screening for other metabolic disorders     ICD-10-CM: Z13.228  ICD-9-CM: V77.99     Abnormal thyroid exam     ICD-10-CM: R94.6  ICD-9-CM: 246.9         Thyroid panel  Lipid panel  Hepatitis C Screening  CMP  CBC  Ultrasound of thyroid  Routine health maintenance reviewed and discussed with Clarisse Moore.    Preventative counseling regarding healthy diet and exercise.   Pt reports that he wears a seatbelt regularly.    Return in about 1 year (around 2/24/2024) for Annual, Labs.

## 2023-02-25 LAB
ALBUMIN SERPL-MCNC: 4.4 G/DL (ref 3.8–4.8)
ALBUMIN/GLOB SERPL: 2 {RATIO} (ref 1.2–2.2)
ALP SERPL-CCNC: 42 IU/L (ref 44–121)
ALT SERPL-CCNC: 9 IU/L (ref 0–32)
AST SERPL-CCNC: 10 IU/L (ref 0–40)
BASOPHILS # BLD AUTO: 0.1 X10E3/UL (ref 0–0.2)
BASOPHILS NFR BLD AUTO: 1 %
BILIRUB SERPL-MCNC: <0.2 MG/DL (ref 0–1.2)
BUN SERPL-MCNC: 11 MG/DL (ref 6–24)
BUN/CREAT SERPL: 14 (ref 9–23)
CALCIUM SERPL-MCNC: 9.3 MG/DL (ref 8.7–10.2)
CHLORIDE SERPL-SCNC: 104 MMOL/L (ref 96–106)
CHOLEST SERPL-MCNC: 160 MG/DL (ref 100–199)
CHOLEST/HDLC SERPL: 2.7 RATIO (ref 0–4.4)
CO2 SERPL-SCNC: 23 MMOL/L (ref 20–29)
CREAT SERPL-MCNC: 0.79 MG/DL (ref 0.57–1)
EGFRCR SERPLBLD CKD-EPI 2021: 94 ML/MIN/1.73
EOSINOPHIL # BLD AUTO: 0.2 X10E3/UL (ref 0–0.4)
EOSINOPHIL NFR BLD AUTO: 4 %
ERYTHROCYTE [DISTWIDTH] IN BLOOD BY AUTOMATED COUNT: 12.8 % (ref 11.7–15.4)
GLOBULIN SER CALC-MCNC: 2.2 G/DL (ref 1.5–4.5)
GLUCOSE SERPL-MCNC: 95 MG/DL (ref 70–99)
HCT VFR BLD AUTO: 38.8 % (ref 34–46.6)
HCV IGG SERPL QL IA: NON REACTIVE
HDLC SERPL-MCNC: 59 MG/DL
HGB BLD-MCNC: 12.8 G/DL (ref 11.1–15.9)
IMM GRANULOCYTES # BLD AUTO: 0 X10E3/UL (ref 0–0.1)
IMM GRANULOCYTES NFR BLD AUTO: 0 %
LDLC SERPL CALC-MCNC: 82 MG/DL (ref 0–99)
LYMPHOCYTES # BLD AUTO: 2.1 X10E3/UL (ref 0.7–3.1)
LYMPHOCYTES NFR BLD AUTO: 36 %
MCH RBC QN AUTO: 27.5 PG (ref 26.6–33)
MCHC RBC AUTO-ENTMCNC: 33 G/DL (ref 31.5–35.7)
MCV RBC AUTO: 83 FL (ref 79–97)
MONOCYTES # BLD AUTO: 0.4 X10E3/UL (ref 0.1–0.9)
MONOCYTES NFR BLD AUTO: 8 %
NEUTROPHILS # BLD AUTO: 3 X10E3/UL (ref 1.4–7)
NEUTROPHILS NFR BLD AUTO: 51 %
PLATELET # BLD AUTO: 300 X10E3/UL (ref 150–450)
POTASSIUM SERPL-SCNC: 4.9 MMOL/L (ref 3.5–5.2)
PROT SERPL-MCNC: 6.6 G/DL (ref 6–8.5)
RBC # BLD AUTO: 4.65 X10E6/UL (ref 3.77–5.28)
SODIUM SERPL-SCNC: 141 MMOL/L (ref 134–144)
T3FREE SERPL-MCNC: 2.9 PG/ML (ref 2–4.4)
T4 FREE SERPL-MCNC: 1.57 NG/DL (ref 0.82–1.77)
THYROPEROXIDASE AB SERPL-ACNC: 18 IU/ML (ref 0–34)
TRIGL SERPL-MCNC: 107 MG/DL (ref 0–149)
TSH SERPL DL<=0.005 MIU/L-ACNC: 0.65 UIU/ML (ref 0.45–4.5)
VLDLC SERPL CALC-MCNC: 19 MG/DL (ref 5–40)
WBC # BLD AUTO: 5.9 X10E3/UL (ref 3.4–10.8)

## 2023-02-27 LAB — THYROGLOB AB SERPL-ACNC: <1 IU/ML (ref 0–0.9)

## 2023-04-03 ENCOUNTER — TELEPHONE (OUTPATIENT)
Dept: GASTROENTEROLOGY | Facility: CLINIC | Age: 48
End: 2023-04-03
Payer: COMMERCIAL

## 2023-04-03 NOTE — TELEPHONE ENCOUNTER
"Pt in office to  stool kit.  See o/v note of 12/8/22: \"She is currently asymptomatic at this point and wishes to continue to observe for now.  We will check routine labs today and repeat fecal calprotectin in 6 months.  If the level has increased significantly, will proceed with enterography.\"    Confer with pt  Advise of above.  States thought was to repeat stool study in April.  States doing very well - no issues.  Will complete stool study in June as per above instructions.      Advise pt Dr Damon out of office this wk.  Will send update for review upon return.  Verb understanding.   "

## 2023-04-10 NOTE — TELEPHONE ENCOUNTER
Patient notified of recommendations and verbalized understanding    Stool kit placed at the  for her to .

## 2023-04-10 NOTE — TELEPHONE ENCOUNTER
Either is fine as long as she continues to be asymptomatic-we really just wanted to get an interim checkup for inflammation.

## 2023-05-26 LAB — CALPROTECTIN STL-MCNT: 53 UG/G (ref 0–120)

## 2023-06-07 ENCOUNTER — TELEPHONE (OUTPATIENT)
Dept: GASTROENTEROLOGY | Facility: CLINIC | Age: 48
End: 2023-06-07
Payer: COMMERCIAL

## 2023-06-07 NOTE — TELEPHONE ENCOUNTER
----- Message from Ade Damon MD sent at 5/30/2023  9:18 AM EDT -----  Fecal calprotectin has improved from prior (now normal) - any symptoms?

## 2023-06-07 NOTE — TELEPHONE ENCOUNTER
"Call to pt.  Advise per DR Damon note.  Verb understanding.     States no further symptoms - \"doing great\".  Update to DR Damon.   "

## 2023-11-01 ENCOUNTER — TELEPHONE (OUTPATIENT)
Dept: GASTROENTEROLOGY | Facility: CLINIC | Age: 48
End: 2023-11-01
Payer: COMMERCIAL

## 2023-11-01 DIAGNOSIS — K50.00 CROHN'S DISEASE OF SMALL INTESTINE WITHOUT COMPLICATION: Primary | ICD-10-CM

## 2023-11-01 NOTE — TELEPHONE ENCOUNTER
Patient called she isnt having any issues but it as been over a year since she has been  here. , and would like to know if it possible to get another fecal calprotectin test done sometime in the next couple of months to keep an eye on my inflammation levels?       Please advise

## 2023-11-08 ENCOUNTER — CLINICAL SUPPORT (OUTPATIENT)
Dept: FAMILY MEDICINE CLINIC | Facility: CLINIC | Age: 48
End: 2023-11-08
Payer: COMMERCIAL

## 2023-11-08 ENCOUNTER — TELEPHONE (OUTPATIENT)
Dept: FAMILY MEDICINE CLINIC | Facility: CLINIC | Age: 48
End: 2023-11-08
Payer: COMMERCIAL

## 2023-11-08 DIAGNOSIS — R94.6 ABNORMAL THYROID EXAM: Primary | ICD-10-CM

## 2023-11-08 DIAGNOSIS — Z23 NEED FOR VACCINATION: Primary | ICD-10-CM

## 2023-11-08 PROCEDURE — 90480 ADMN SARSCOV2 VAC 1/ONLY CMP: CPT | Performed by: NURSE PRACTITIONER

## 2023-11-08 PROCEDURE — 91320 SARSCV2 VAC 30MCG TRS-SUC IM: CPT | Performed by: NURSE PRACTITIONER

## 2023-11-08 PROCEDURE — 90471 IMMUNIZATION ADMIN: CPT | Performed by: NURSE PRACTITIONER

## 2023-11-08 PROCEDURE — 90686 IIV4 VACC NO PRSV 0.5 ML IM: CPT | Performed by: NURSE PRACTITIONER

## 2023-11-08 NOTE — TELEPHONE ENCOUNTER
At checkout patient stated that at the first of this year Jena wanted her to have a thyroid ultrasound, patient wanted to wait until her deductible was met. Patient has met deductible and is asking for a referral to be sent to [facility] for a thyroid ultrasound.

## 2023-11-09 NOTE — TELEPHONE ENCOUNTER
Patient notified of order placed for thyroid ultrasound. Patient was given number for central scheduling to schedule imaging.

## 2023-11-22 ENCOUNTER — HOSPITAL ENCOUNTER (OUTPATIENT)
Facility: HOSPITAL | Age: 48
Discharge: HOME | End: 2023-11-22
Admitting: NURSE PRACTITIONER
Payer: COMMERCIAL

## 2023-11-22 DIAGNOSIS — R94.6 ABNORMAL THYROID EXAM: ICD-10-CM

## 2023-11-22 PROCEDURE — 76536 US EXAM OF HEAD AND NECK: CPT

## 2023-11-27 DIAGNOSIS — E04.2 MULTIPLE THYROID NODULES: ICD-10-CM

## 2023-11-27 DIAGNOSIS — E01.0 THYROMEGALY: Primary | ICD-10-CM

## 2024-03-01 ENCOUNTER — OFFICE VISIT (OUTPATIENT)
Dept: FAMILY MEDICINE CLINIC | Facility: CLINIC | Age: 49
End: 2024-03-01
Payer: COMMERCIAL

## 2024-03-01 VITALS
RESPIRATION RATE: 18 BRPM | HEART RATE: 77 BPM | OXYGEN SATURATION: 100 % | HEIGHT: 69 IN | DIASTOLIC BLOOD PRESSURE: 60 MMHG | BODY MASS INDEX: 20.73 KG/M2 | SYSTOLIC BLOOD PRESSURE: 98 MMHG | WEIGHT: 140 LBS

## 2024-03-01 DIAGNOSIS — Z13.220 SCREENING FOR HYPERLIPIDEMIA: ICD-10-CM

## 2024-03-01 DIAGNOSIS — Z23 NEED FOR VACCINATION: ICD-10-CM

## 2024-03-01 DIAGNOSIS — Z13.228 ENCOUNTER FOR SCREENING FOR OTHER METABOLIC DISORDERS: ICD-10-CM

## 2024-03-01 DIAGNOSIS — Z00.00 ANNUAL PHYSICAL EXAM: Primary | ICD-10-CM

## 2024-03-01 DIAGNOSIS — F40.243 FEAR OF FLYING: ICD-10-CM

## 2024-03-01 RX ORDER — ALPRAZOLAM 0.25 MG/1
0.25 TABLET ORAL 2 TIMES DAILY PRN
Qty: 4 TABLET | Refills: 0 | Status: SHIPPED | OUTPATIENT
Start: 2024-03-01

## 2024-03-01 NOTE — PROGRESS NOTES
Preventive Exam    History of Present Illness: Clarisse Moore is a 48 y.o. here for check up and review of routine health maintenance. she states she is doing well and has no concerns.    Patient has flight anxiety, ongoing. She was previously prescribed xanax for this. She is flying to [place] in 3 weeks and is requesting medication for flight.     Past medical history, surgical history and family history have been reviewed.     Review of Systems   Constitutional:  Negative for appetite change, chills, fatigue and fever.   HENT:  Negative for congestion, dental problem, nosebleeds, sinus pressure and sore throat.    Eyes: Negative.  Negative for blurred vision, double vision, photophobia and visual disturbance.   Respiratory:  Negative for cough, chest tightness, shortness of breath and wheezing.    Cardiovascular:  Negative for chest pain, palpitations and leg swelling.   Gastrointestinal:  Negative for abdominal pain, constipation and diarrhea.   Endocrine: Negative.  Negative for cold intolerance and heat intolerance.   Genitourinary: Negative.  Negative for breast discharge, breast lump, breast pain, dysuria, flank pain, pelvic pain and pelvic pressure.   Musculoskeletal:  Negative for arthralgias, back pain, joint swelling and myalgias.   Skin: Negative.    Allergic/Immunologic: Negative.  Negative for environmental allergies and food allergies.   Neurological:  Negative for dizziness, weakness, numbness and headache.   Hematological: Negative.  Does not bruise/bleed easily.   Psychiatric/Behavioral: Negative.  Negative for hallucinations and depressed mood.        PHYSICAL EXAM    Vitals:    03/01/24 0838   BP: 98/60   Pulse: 77   Resp: 18   SpO2: 100%       Body mass index is 20.67 kg/m².   BMI is within normal parameters. No other follow-up for BMI required.       Physical Exam  Vitals and nursing note reviewed.   Constitutional:       Appearance: Normal appearance. She is well-developed and normal  weight.   HENT:      Head: Normocephalic and atraumatic.      Right Ear: Tympanic membrane, ear canal and external ear normal.      Left Ear: Tympanic membrane, ear canal and external ear normal.      Nose: Nose normal.      Mouth/Throat:      Lips: Pink.      Mouth: Mucous membranes are moist.      Tongue: No lesions.      Palate: No mass and lesions.      Pharynx: Oropharynx is clear. Uvula midline.      Tonsils: No tonsillar exudate.   Eyes:      Conjunctiva/sclera: Conjunctivae normal.      Pupils: Pupils are equal, round, and reactive to light.   Neck:      Thyroid: No thyromegaly.   Cardiovascular:      Rate and Rhythm: Normal rate and regular rhythm.      Pulses: Normal pulses.           Dorsalis pedis pulses are 2+ on the right side and 2+ on the left side.        Posterior tibial pulses are 2+ on the right side and 2+ on the left side.      Heart sounds: Normal heart sounds. No murmur heard.  Pulmonary:      Effort: Pulmonary effort is normal.      Breath sounds: Normal breath sounds.   Abdominal:      General: Bowel sounds are normal. There is no distension.      Palpations: Abdomen is soft.      Tenderness: There is no abdominal tenderness.   Musculoskeletal:         General: No deformity. Normal range of motion.      Cervical back: Normal range of motion and neck supple.      Right lower leg: No edema.      Left lower leg: No edema.   Lymphadenopathy:      Head:      Right side of head: No submental, submandibular, tonsillar, preauricular, posterior auricular or occipital adenopathy.      Left side of head: No submental, submandibular, tonsillar, preauricular, posterior auricular or occipital adenopathy.      Cervical: No cervical adenopathy.      Right cervical: No superficial, deep or posterior cervical adenopathy.     Left cervical: No superficial, deep or posterior cervical adenopathy.      Upper Body:      Right upper body: No supraclavicular adenopathy.      Left upper body: No supraclavicular  adenopathy.   Skin:     General: Skin is warm and dry.      Capillary Refill: Capillary refill takes 2 to 3 seconds.   Neurological:      General: No focal deficit present.      Mental Status: She is alert and oriented to person, place, and time.      Cranial Nerves: No cranial nerve deficit.      Sensory: Sensation is intact.      Motor: Motor function is intact.      Coordination: Coordination is intact.      Gait: Gait is intact.   Psychiatric:         Attention and Perception: Attention and perception normal.         Mood and Affect: Mood and affect normal.         Speech: Speech normal.         Behavior: Behavior normal. Behavior is cooperative.         Thought Content: Thought content normal.         Cognition and Memory: Cognition and memory normal.         Judgment: Judgment normal.         Procedures    Diagnoses and all orders for this visit:    1. Annual physical exam (Primary)    2. Need for vaccination  -     Tdap Vaccine => 8yo IM (BOOSTRIX)    3. Encounter for screening for other metabolic disorders  -     CBC & Differential  -     Comprehensive Metabolic Panel    4. Screening for hyperlipidemia  -     Lipid Panel With / Chol / HDL Ratio    5. Fear of flying  -     ALPRAZolam (Xanax) 0.25 MG tablet; Take 1 tablet by mouth 2 (Two) Times a Day As Needed for Anxiety.  Dispense: 4 tablet; Refill: 0        Problems Addressed this Visit    None  Visit Diagnoses       Annual physical exam    -  Primary    Need for vaccination        Relevant Orders    Tdap Vaccine => 8yo IM (BOOSTRIX)    Encounter for screening for other metabolic disorders        Relevant Orders    CBC & Differential    Comprehensive Metabolic Panel    Screening for hyperlipidemia        Relevant Orders    Lipid Panel With / Chol / HDL Ratio    Fear of flying        Relevant Medications    ALPRAZolam (Xanax) 0.25 MG tablet          Diagnoses         Codes Comments    Annual physical exam    -  Primary ICD-10-CM: Z00.00  ICD-9-CM: V70.0      Need for vaccination     ICD-10-CM: Z23  ICD-9-CM: V05.9     Encounter for screening for other metabolic disorders     ICD-10-CM: Z13.228  ICD-9-CM: V77.99     Screening for hyperlipidemia     ICD-10-CM: Z13.220  ICD-9-CM: V77.91     Fear of flying     ICD-10-CM: F40.243  ICD-9-CM: 300.29           Lipid panel  CBC  CMP  Tdap vaccine  Xanax 0.25 mg 1 p.o. BID PRN  Routine health maintenance reviewed and discussed with Clarisse Moore.    Preventative counseling regarding healthy diet and exercise.   Pt reports that he wears a seatbelt regularly.     Return if symptoms worsen or fail to improve.

## 2024-03-02 LAB
ALBUMIN SERPL-MCNC: 4.6 G/DL (ref 3.5–5.2)
ALBUMIN/GLOB SERPL: 2.3 G/DL
ALP SERPL-CCNC: 38 U/L (ref 39–117)
ALT SERPL-CCNC: 13 U/L (ref 1–33)
AST SERPL-CCNC: 16 U/L (ref 1–32)
BASOPHILS # BLD AUTO: 0.07 10*3/MM3 (ref 0–0.2)
BASOPHILS NFR BLD AUTO: 1.1 % (ref 0–1.5)
BILIRUB SERPL-MCNC: 0.6 MG/DL (ref 0–1.2)
BUN SERPL-MCNC: 12 MG/DL (ref 6–20)
BUN/CREAT SERPL: 14.8 (ref 7–25)
CALCIUM SERPL-MCNC: 9.2 MG/DL (ref 8.6–10.5)
CHLORIDE SERPL-SCNC: 105 MMOL/L (ref 98–107)
CHOLEST SERPL-MCNC: 167 MG/DL (ref 0–200)
CHOLEST/HDLC SERPL: 2.57 {RATIO}
CO2 SERPL-SCNC: 24.4 MMOL/L (ref 22–29)
CREAT SERPL-MCNC: 0.81 MG/DL (ref 0.57–1)
EGFRCR SERPLBLD CKD-EPI 2021: 90.8 ML/MIN/1.73
EOSINOPHIL # BLD AUTO: 0.32 10*3/MM3 (ref 0–0.4)
EOSINOPHIL NFR BLD AUTO: 5 % (ref 0.3–6.2)
ERYTHROCYTE [DISTWIDTH] IN BLOOD BY AUTOMATED COUNT: 12.2 % (ref 12.3–15.4)
GLOBULIN SER CALC-MCNC: 2 GM/DL
GLUCOSE SERPL-MCNC: 91 MG/DL (ref 65–99)
HCT VFR BLD AUTO: 39.9 % (ref 34–46.6)
HDLC SERPL-MCNC: 65 MG/DL (ref 40–60)
HGB BLD-MCNC: 13.3 G/DL (ref 12–15.9)
IMM GRANULOCYTES # BLD AUTO: 0.01 10*3/MM3 (ref 0–0.05)
IMM GRANULOCYTES NFR BLD AUTO: 0.2 % (ref 0–0.5)
LDLC SERPL CALC-MCNC: 90 MG/DL (ref 0–100)
LYMPHOCYTES # BLD AUTO: 1.93 10*3/MM3 (ref 0.7–3.1)
LYMPHOCYTES NFR BLD AUTO: 30 % (ref 19.6–45.3)
MCH RBC QN AUTO: 27.7 PG (ref 26.6–33)
MCHC RBC AUTO-ENTMCNC: 33.3 G/DL (ref 31.5–35.7)
MCV RBC AUTO: 83 FL (ref 79–97)
MONOCYTES # BLD AUTO: 0.49 10*3/MM3 (ref 0.1–0.9)
MONOCYTES NFR BLD AUTO: 7.6 % (ref 5–12)
NEUTROPHILS # BLD AUTO: 3.62 10*3/MM3 (ref 1.7–7)
NEUTROPHILS NFR BLD AUTO: 56.1 % (ref 42.7–76)
NRBC BLD AUTO-RTO: 0 /100 WBC (ref 0–0.2)
PLATELET # BLD AUTO: 290 10*3/MM3 (ref 140–450)
POTASSIUM SERPL-SCNC: 4.7 MMOL/L (ref 3.5–5.2)
PROT SERPL-MCNC: 6.6 G/DL (ref 6–8.5)
RBC # BLD AUTO: 4.81 10*6/MM3 (ref 3.77–5.28)
SODIUM SERPL-SCNC: 142 MMOL/L (ref 136–145)
TRIGL SERPL-MCNC: 63 MG/DL (ref 0–150)
VLDLC SERPL CALC-MCNC: 12 MG/DL (ref 5–40)
WBC # BLD AUTO: 6.44 10*3/MM3 (ref 3.4–10.8)

## 2024-04-26 ENCOUNTER — LAB (OUTPATIENT)
Dept: LAB | Facility: HOSPITAL | Age: 49
End: 2024-04-26
Payer: COMMERCIAL

## 2024-04-26 PROCEDURE — 83993 ASSAY FOR CALPROTECTIN FECAL: CPT | Performed by: INTERNAL MEDICINE

## 2024-05-08 ENCOUNTER — TELEPHONE (OUTPATIENT)
Dept: GASTROENTEROLOGY | Facility: CLINIC | Age: 49
End: 2024-05-08
Payer: COMMERCIAL

## 2024-10-29 ENCOUNTER — FLU SHOT (OUTPATIENT)
Dept: FAMILY MEDICINE CLINIC | Facility: CLINIC | Age: 49
End: 2024-10-29
Payer: COMMERCIAL

## 2024-10-29 DIAGNOSIS — Z23 NEED FOR VACCINATION: Primary | ICD-10-CM

## 2024-10-29 PROCEDURE — 90656 IIV3 VACC NO PRSV 0.5 ML IM: CPT | Performed by: NURSE PRACTITIONER

## 2024-10-29 PROCEDURE — 90480 ADMN SARSCOV2 VAC 1/ONLY CMP: CPT | Performed by: NURSE PRACTITIONER

## 2024-10-29 PROCEDURE — 90471 IMMUNIZATION ADMIN: CPT | Performed by: NURSE PRACTITIONER

## 2024-10-29 PROCEDURE — 91320 SARSCV2 VAC 30MCG TRS-SUC IM: CPT | Performed by: NURSE PRACTITIONER

## 2024-11-06 ENCOUNTER — TELEPHONE (OUTPATIENT)
Dept: GASTROENTEROLOGY | Facility: CLINIC | Age: 49
End: 2024-11-06

## 2024-11-06 ENCOUNTER — TELEPHONE (OUTPATIENT)
Dept: GASTROENTEROLOGY | Facility: CLINIC | Age: 49
End: 2024-11-06
Payer: COMMERCIAL

## 2024-11-06 DIAGNOSIS — K50.00 CROHN'S DISEASE OF SMALL INTESTINE WITHOUT COMPLICATION: Primary | ICD-10-CM

## 2024-11-06 NOTE — TELEPHONE ENCOUNTER
Caller: Clarisse Moore    Relationship to patient: Self    Best call back number: [phone]    Patient is needing: PT WOULD LIKE TO SEE IF SHE CAN GET THE STOOL INFLAMMATION TEST KIT BEFORE HER NEXT APPT. PLEASE CALL PT TO ADVISE

## 2024-12-04 ENCOUNTER — TELEPHONE (OUTPATIENT)
Dept: FAMILY MEDICINE CLINIC | Facility: CLINIC | Age: 49
End: 2024-12-04
Payer: COMMERCIAL

## 2024-12-04 NOTE — TELEPHONE ENCOUNTER
----- Message from Jena Marie sent at 12/2/2024 11:40 AM EST -----  Regarding: FW: Thyroid ultrasound  Can you call patient and find out if ENT has ordered her follow-up thyroid ultrasound. If not, she needs to follow-up with them to schedule.  ----- Message -----  From: Jena Marie APRN  Sent: 11/27/2024  12:00 AM EST  To: TIERNEY Garcia  Subject: Thyroid ultrasound                               Make sure follow-up thyroid ultrasound has been ordered/completed

## 2024-12-04 NOTE — TELEPHONE ENCOUNTER
Patient stated that they have not ordered this. She will reach out to them and see if they will order it.

## 2025-01-08 ENCOUNTER — OFFICE VISIT (OUTPATIENT)
Dept: GASTROENTEROLOGY | Facility: CLINIC | Age: 50
End: 2025-01-08
Payer: COMMERCIAL

## 2025-01-08 VITALS
SYSTOLIC BLOOD PRESSURE: 125 MMHG | HEIGHT: 69 IN | BODY MASS INDEX: 21.42 KG/M2 | WEIGHT: 144.6 LBS | HEART RATE: 109 BPM | DIASTOLIC BLOOD PRESSURE: 76 MMHG | TEMPERATURE: 97.7 F

## 2025-01-08 DIAGNOSIS — K50.00 CROHN'S DISEASE OF SMALL INTESTINE WITHOUT COMPLICATION: Primary | ICD-10-CM

## 2025-01-08 PROCEDURE — 99214 OFFICE O/P EST MOD 30 MIN: CPT | Performed by: INTERNAL MEDICINE

## 2025-01-08 NOTE — PROGRESS NOTES
Subjective   Chief Complaint   Patient presents with    Crohn's Disease       Clarisse Moore is a  49 y.o. female here for a follow up visit for Crohn's disease.     This disease was diagnosed in 2021 after routine colonoscopy which showed ileitis.  She also had an adenomatous polyp removed at that time.She did CT enterography that showed mild mucosal enhancement of the terminal ileum with no transmural inflammation or surrounding inflammatory change or complication. Fecal calprotectin in 2021 was normal.  She was treated after initial colonoscopy with budesonide.    She has not been on treatment for some time.  She has not had any recurrence of the original burning abdominal pain she once experienced.  She reports that she was under a lot of stress in  and she did experience some bloating and looser stools.  She is made changes to her lifestyle which have improved her stress level and the symptoms have resolved.  No blood in the stool.    Recent fecal calprotectin was elevated at 230-this is higher than previous levels.  HPI  Past Medical History:   Diagnosis Date    Abdominal pain     LEFT    Colon polyp 2021    Constipation     Crohn's disease 2021    Depression     Environmental allergies     Fear of flying     PONV (postoperative nausea and vomiting)     Ulcer      Past Surgical History:   Procedure Laterality Date     SECTION      COLONOSCOPY N/A 2021    Procedure: COLONOSCOPY TO CECUM AND INTO TERMINAL ILEUM WITH HOT AND COLD SNARE POLYPECTOMY AND COLD BIOPSIES;  Surgeon: Ade Damon MD;  Location: [facility] ENDOSCOPY;  Service: Gastroenterology;  Laterality: N/A;  PRE: LLQ PAIN, RECTAL BLEEDING  POST: POLYPS, HEMORRHOIDS, ILEITIS    HYSTERECTOMY      partial     MOUTH SURGERY         Current Outpatient Medications:     ALPRAZolam (Xanax) 0.25 MG tablet, Take 1 tablet by mouth 2 (Two) Times a Day As Needed for Anxiety., Disp: 4 tablet, Rfl: 0    Cyanocobalamin  (VITAMIN B-12 PO), Take  by mouth., Disp: , Rfl:     VITAMIN D PO, Take  by mouth., Disp: , Rfl:     vitamin E 100 UNIT capsule, Take 1 capsule by mouth Daily As Needed., Disp: , Rfl:   PRN Meds:.  Allergies   Allergen Reactions    Codeine GI Intolerance     Social History     Socioeconomic History    Marital status:    Tobacco Use    Smoking status: Former     Current packs/day: 0.00     Average packs/day: 1 pack/day for 13.0 years (13.0 ttl pk-yrs)     Types: Cigarettes     Start date: 1/1/1997     Quit date: 1/1/2010     Years since quitting: 15.0    Smokeless tobacco: Never   Vaping Use    Vaping status: Never Used   Substance and Sexual Activity    Alcohol use: Not Currently     Alcohol/week: 2.0 standard drinks of alcohol     Comment: Stopped alcohol use 30 days ago    Drug use: Never    Sexual activity: Yes     Partners: Male     Birth control/protection: Other, Hysterectomy     Family History   Problem Relation Age of Onset    Bipolar disorder Mother     Depression Mother     Mental illness Mother         Bipolar Disorder    Thyroid disease Mother     Vision loss Mother     Celiac disease Father     Colon polyps Father     Bipolar disorder Brother     Breast cancer Paternal Aunt     Celiac disease Paternal Aunt     Heart disease Paternal Grandmother     Lung cancer Paternal Grandmother     Colon cancer Paternal Grandfather     Mental illness Brother         Bipolar Disorder    Malig Hyperthermia Neg Hx      Review of Systems   Constitutional:  Negative for appetite change and unexpected weight change.   Gastrointestinal:  Positive for diarrhea. Negative for abdominal pain and blood in stool.     Vitals:    01/08/25 1519   BP: 125/76   Pulse: 109   Temp: 97.7 °F (36.5 °C)         01/08/25  1519   Weight: 65.6 kg (144 lb 9.6 oz)       Objective   Physical Exam  Constitutional:       Appearance: Normal appearance. She is well-developed.   HENT:      Head: Normocephalic and atraumatic.   Eyes:       "General: No scleral icterus.     Conjunctiva/sclera: Conjunctivae normal.   Pulmonary:      Effort: Pulmonary effort is normal.   Abdominal:      General: There is no distension.      Palpations: Abdomen is soft.   Musculoskeletal:      Cervical back: Normal range of motion and neck supple.   Skin:     General: Skin is warm and dry.   Neurological:      Mental Status: She is alert.   Psychiatric:         Mood and Affect: Mood normal.         Behavior: Behavior normal.       Lab Results   Component Value Date    WBC 6.44 03/01/2024    HGB 13.3 03/01/2024    HCT 39.9 03/01/2024    MCV 83.0 03/01/2024     03/01/2024     Lab Results   Component Value Date    GLUCOSE 91 03/01/2024    BUN 12 03/01/2024    CREATININE 0.81 03/01/2024    EGFRRESULT 90.8 03/01/2024    BCR 14.8 03/01/2024    K 4.7 03/01/2024    CO2 24.4 03/01/2024    CALCIUM 9.2 03/01/2024    PROTENTOTREF 6.6 03/01/2024    ALBUMIN 4.6 03/01/2024    BILITOT 0.6 03/01/2024    AST 16 03/01/2024    ALT 13 03/01/2024     No results found for: \"[id number]\"   Calprotectin, Fecal   Date Value Ref Range Status   12/18/2024 230 (H) 0 - 120 ug/g Final     Comment:     Concentration     Interpretation   Follow-Up  < 5 - 50 ug/g     Normal           None  >50 -120 ug/g     Borderline       Re-evaluate in 4-6 weeks      >120 ug/g     Abnormal         Repeat as clinically                                     indicated          No radiology results for the last 90 days.     Assessment & Plan   Diagnoses and all orders for this visit:    Crohn's disease of small intestine without complication  -     CBC & Differential  -     Ferritin  -     Vitamin D 25 Hydroxy  -     Vitamin B12  -     Comprehensive Metabolic Panel      Plan:  Recommend repeating CT enterography at this time for follow-up of her ileitis given recent fecal calprotectin showing higher than previous elevation.  She is not on medication for ileitis at this time.  Routine labs today  Will be due for repeat " colonoscopy due to history of adenomatous polyp in 2026

## 2025-01-09 LAB
25(OH)D3+25(OH)D2 SERPL-MCNC: 27.5 NG/ML (ref 30–100)
ALBUMIN SERPL-MCNC: 3.9 G/DL (ref 3.5–5.2)
ALBUMIN/GLOB SERPL: 1.6 G/DL
ALP SERPL-CCNC: 41 U/L (ref 39–117)
ALT SERPL-CCNC: 8 U/L (ref 1–33)
AST SERPL-CCNC: 10 U/L (ref 1–32)
BASOPHILS # BLD AUTO: 0.09 10*3/MM3 (ref 0–0.2)
BASOPHILS NFR BLD AUTO: 1.2 % (ref 0–1.5)
BILIRUB SERPL-MCNC: <0.2 MG/DL (ref 0–1.2)
BUN SERPL-MCNC: 14 MG/DL (ref 6–20)
BUN/CREAT SERPL: 17.5 (ref 7–25)
CALCIUM SERPL-MCNC: 9.4 MG/DL (ref 8.6–10.5)
CHLORIDE SERPL-SCNC: 106 MMOL/L (ref 98–107)
CO2 SERPL-SCNC: 24.7 MMOL/L (ref 22–29)
CREAT SERPL-MCNC: 0.8 MG/DL (ref 0.57–1)
EGFRCR SERPLBLD CKD-EPI 2021: 91.6 ML/MIN/1.73
EOSINOPHIL # BLD AUTO: 0.3 10*3/MM3 (ref 0–0.4)
EOSINOPHIL NFR BLD AUTO: 3.9 % (ref 0.3–6.2)
ERYTHROCYTE [DISTWIDTH] IN BLOOD BY AUTOMATED COUNT: 12.4 % (ref 12.3–15.4)
FERRITIN SERPL-MCNC: 38.9 NG/ML (ref 13–150)
GLOBULIN SER CALC-MCNC: 2.5 GM/DL
GLUCOSE SERPL-MCNC: 95 MG/DL (ref 65–99)
HCT VFR BLD AUTO: 39 % (ref 34–46.6)
HGB BLD-MCNC: 12.9 G/DL (ref 12–15.9)
IMM GRANULOCYTES # BLD AUTO: 0.02 10*3/MM3 (ref 0–0.05)
IMM GRANULOCYTES NFR BLD AUTO: 0.3 % (ref 0–0.5)
LYMPHOCYTES # BLD AUTO: 2.34 10*3/MM3 (ref 0.7–3.1)
LYMPHOCYTES NFR BLD AUTO: 30.7 % (ref 19.6–45.3)
MCH RBC QN AUTO: 28.3 PG (ref 26.6–33)
MCHC RBC AUTO-ENTMCNC: 33.1 G/DL (ref 31.5–35.7)
MCV RBC AUTO: 85.5 FL (ref 79–97)
MONOCYTES # BLD AUTO: 0.6 10*3/MM3 (ref 0.1–0.9)
MONOCYTES NFR BLD AUTO: 7.9 % (ref 5–12)
NEUTROPHILS # BLD AUTO: 4.27 10*3/MM3 (ref 1.7–7)
NEUTROPHILS NFR BLD AUTO: 56 % (ref 42.7–76)
NRBC BLD AUTO-RTO: 0 /100 WBC (ref 0–0.2)
PLATELET # BLD AUTO: 295 10*3/MM3 (ref 140–450)
POTASSIUM SERPL-SCNC: 4.2 MMOL/L (ref 3.5–5.2)
PROT SERPL-MCNC: 6.4 G/DL (ref 6–8.5)
RBC # BLD AUTO: 4.56 10*6/MM3 (ref 3.77–5.28)
SODIUM SERPL-SCNC: 140 MMOL/L (ref 136–145)
VIT B12 SERPL-MCNC: 606 PG/ML (ref 211–946)
WBC # BLD AUTO: 7.62 10*3/MM3 (ref 3.4–10.8)

## 2025-01-10 ENCOUNTER — TELEPHONE (OUTPATIENT)
Dept: GASTROENTEROLOGY | Facility: CLINIC | Age: 50
End: 2025-01-10
Payer: COMMERCIAL

## 2025-01-10 RX ORDER — ERGOCALCIFEROL 1.25 MG/1
CAPSULE, LIQUID FILLED ORAL
Qty: 10 CAPSULE | Refills: 0 | Status: SHIPPED | OUTPATIENT
Start: 2025-01-10

## 2025-01-10 NOTE — TELEPHONE ENCOUNTER
----- Message from Ade Damon sent at 1/9/2025  2:58 PM EST -----  Clarisse,  Your vitamin D is a little low - Recommend starting an over the counter Vitamin D supplement daily (1000 IU daily).  Your B12 and iron stores look good.  Liver and kidney function tests, blood counts and electrolytes are normal.

## 2025-03-06 ENCOUNTER — OFFICE VISIT (OUTPATIENT)
Dept: FAMILY MEDICINE CLINIC | Facility: CLINIC | Age: 50
End: 2025-03-06
Payer: COMMERCIAL

## 2025-03-06 VITALS
OXYGEN SATURATION: 98 % | HEIGHT: 69 IN | DIASTOLIC BLOOD PRESSURE: 62 MMHG | HEART RATE: 72 BPM | WEIGHT: 149 LBS | SYSTOLIC BLOOD PRESSURE: 100 MMHG | RESPIRATION RATE: 18 BRPM | BODY MASS INDEX: 22.07 KG/M2

## 2025-03-06 DIAGNOSIS — Z13.220 SCREENING FOR HYPERLIPIDEMIA: ICD-10-CM

## 2025-03-06 DIAGNOSIS — E04.2 MULTIPLE THYROID NODULES: ICD-10-CM

## 2025-03-06 DIAGNOSIS — Z13.228 ENCOUNTER FOR SCREENING FOR OTHER METABOLIC DISORDERS: ICD-10-CM

## 2025-03-06 DIAGNOSIS — Z00.00 ANNUAL PHYSICAL EXAM: Primary | ICD-10-CM

## 2025-03-06 LAB
ALBUMIN SERPL-MCNC: 4 G/DL (ref 3.5–5.2)
ALBUMIN/GLOB SERPL: 1.7 G/DL
ALP SERPL-CCNC: 38 U/L (ref 39–117)
ALT SERPL-CCNC: 11 U/L (ref 1–33)
AST SERPL-CCNC: 14 U/L (ref 1–32)
BASOPHILS # BLD AUTO: 0.08 10*3/MM3 (ref 0–0.2)
BASOPHILS NFR BLD AUTO: 1.4 % (ref 0–1.5)
BILIRUB SERPL-MCNC: 0.5 MG/DL (ref 0–1.2)
BUN SERPL-MCNC: 11 MG/DL (ref 6–20)
BUN/CREAT SERPL: 14.7 (ref 7–25)
CALCIUM SERPL-MCNC: 8.9 MG/DL (ref 8.6–10.5)
CHLORIDE SERPL-SCNC: 107 MMOL/L (ref 98–107)
CHOLEST SERPL-MCNC: 166 MG/DL (ref 0–200)
CHOLEST/HDLC SERPL: 2.96 {RATIO}
CO2 SERPL-SCNC: 23.2 MMOL/L (ref 22–29)
CREAT SERPL-MCNC: 0.75 MG/DL (ref 0.57–1)
EGFRCR SERPLBLD CKD-EPI 2021: 98.3 ML/MIN/1.73
EOSINOPHIL # BLD AUTO: 0.29 10*3/MM3 (ref 0–0.4)
EOSINOPHIL NFR BLD AUTO: 5.1 % (ref 0.3–6.2)
ERYTHROCYTE [DISTWIDTH] IN BLOOD BY AUTOMATED COUNT: 12.3 % (ref 12.3–15.4)
GLOBULIN SER CALC-MCNC: 2.4 GM/DL
GLUCOSE SERPL-MCNC: 91 MG/DL (ref 65–99)
HCT VFR BLD AUTO: 38.3 % (ref 34–46.6)
HDLC SERPL-MCNC: 56 MG/DL (ref 40–60)
HGB BLD-MCNC: 12.6 G/DL (ref 12–15.9)
IMM GRANULOCYTES # BLD AUTO: 0.01 10*3/MM3 (ref 0–0.05)
IMM GRANULOCYTES NFR BLD AUTO: 0.2 % (ref 0–0.5)
LDLC SERPL CALC-MCNC: 101 MG/DL (ref 0–100)
LYMPHOCYTES # BLD AUTO: 2 10*3/MM3 (ref 0.7–3.1)
LYMPHOCYTES NFR BLD AUTO: 35.1 % (ref 19.6–45.3)
MCH RBC QN AUTO: 27.9 PG (ref 26.6–33)
MCHC RBC AUTO-ENTMCNC: 32.9 G/DL (ref 31.5–35.7)
MCV RBC AUTO: 84.7 FL (ref 79–97)
MONOCYTES # BLD AUTO: 0.48 10*3/MM3 (ref 0.1–0.9)
MONOCYTES NFR BLD AUTO: 8.4 % (ref 5–12)
NEUTROPHILS # BLD AUTO: 2.83 10*3/MM3 (ref 1.7–7)
NEUTROPHILS NFR BLD AUTO: 49.8 % (ref 42.7–76)
NRBC BLD AUTO-RTO: 0 /100 WBC (ref 0–0.2)
PLATELET # BLD AUTO: 324 10*3/MM3 (ref 140–450)
POTASSIUM SERPL-SCNC: 4.6 MMOL/L (ref 3.5–5.2)
PROT SERPL-MCNC: 6.4 G/DL (ref 6–8.5)
RBC # BLD AUTO: 4.52 10*6/MM3 (ref 3.77–5.28)
SODIUM SERPL-SCNC: 140 MMOL/L (ref 136–145)
TRIGL SERPL-MCNC: 45 MG/DL (ref 0–150)
VLDLC SERPL CALC-MCNC: 9 MG/DL (ref 5–40)
WBC # BLD AUTO: 5.69 10*3/MM3 (ref 3.4–10.8)

## 2025-03-06 RX ORDER — VAGINAL SUPPOSITORY APPLICATOR
EACH MISCELLANEOUS
COMMUNITY

## 2025-03-06 RX ORDER — PROGESTERONE 100 %
POWDER (GRAM) MISCELLANEOUS
COMMUNITY
Start: 2023-08-01

## 2025-03-06 NOTE — PATIENT INSTRUCTIONS
I will call call or send [employer] message with your lab results.   Please call with any questions or concerns.    Return in about 1 year (around 3/6/2026) for Annual, Labs.

## 2025-03-06 NOTE — PROGRESS NOTES
Preventive Exam    History of Present Illness: Clarisse Moore is a 50 y.o. here for check up and review of routine health maintenance. she states she is doing well and has no concerns.    Past medical history, surgical history and family history have been reviewed.     History of Present Illness  The patient presents for a physical exam.    She has been on hormone replacement therapy (HRT), specifically progesterone, for the past year. Recently, she started estrogen suppositories last week due to the onset of hot flashes and irritability over the past few months. She reports experiencing hot flashes predominantly at night, causing her to wake up drenched in sweat. She is also on Wellbutrin. She is scheduled to start couple's therapy with her  next week. Her testosterone levels are low, and she believes the suppository mixture contains testosterone. She has previously tried an estrogen patch but discontinued it due to adverse effects on her digestive system. She is not currently on any medication for Crohn's disease, which she manages through dietary control. She reports a good appetite and no symptoms of fever or fatigue. She experiences persistent nasal congestion and runny nose, particularly during this time of year. She has seasonal allergies but no known food allergies. She suspects certain foods may cause her discomfort and is considering undergoing food allergy testing. She reports no cough, shortness of breath, diarrhea, constipation, blood in stool, reflux, joint or muscle pain, skin changes, abnormal bruising or bleeding, depression, anxiety, or sleep disturbances. She has a history of hysterectomy but retains her ovaries. She experiences breast pain for one week each month, which has improved with vitamin D supplementation. She has a CAT scan scheduled for her Crohn's disease in 2 weeks with Dr. Ade Damon.    Patient had thyroid ultrasound that revealed multiple nodules. Will repeat ultrasound to  ensure stability.     Supplemental Information  Her dental examination is current.    ALLERGIES  The patient has no known allergies.    MEDICATIONS  Current: Progesterone, estrogen (suppository), Wellbutrin, vitamin D     Review of Systems   Constitutional:  Negative for appetite change, chills, fatigue, fever, unexpected weight gain and unexpected weight loss.   HENT:  Positive for congestion and rhinorrhea. Negative for dental problem, facial swelling, hearing loss, sinus pressure and sore throat.         Dental exam is up to date.    Eyes: Negative.  Negative for blurred vision, double vision, photophobia and visual disturbance.   Respiratory:  Negative for cough, chest tightness, shortness of breath and wheezing.    Cardiovascular:  Negative for chest pain, palpitations and leg swelling.   Gastrointestinal:  Negative for abdominal pain, blood in stool, constipation, diarrhea, nausea, vomiting and GERD.   Endocrine: Negative.  Negative for cold intolerance and heat intolerance.   Genitourinary: Negative.  Positive for breast pain. Negative for breast discharge, breast lump, decreased urine volume, dyspareunia, frequency, menstrual problem, pelvic pain, pelvic pressure, vaginal bleeding and vaginal discharge.   Musculoskeletal:  Negative for arthralgias, back pain, joint swelling, myalgias and neck stiffness.   Skin: Negative.  Negative for color change and pallor.   Allergic/Immunologic: Positive for environmental allergies (Seasonal allergies). Negative for food allergies.   Neurological:  Negative for dizziness, speech difficulty, weakness, numbness and headache.   Hematological: Negative.  Does not bruise/bleed easily.   Psychiatric/Behavioral: Negative.  Negative for behavioral problems, hallucinations, sleep disturbance, suicidal ideas, depressed mood and stress. The patient is not nervous/anxious.        PHYSICAL EXAM    Vitals:    03/06/25 0829   BP: 100/62   Pulse: 72   Resp: 18   SpO2: 98%       Body  mass index is 22 kg/m².   BMI is within normal parameters. No other follow-up for BMI required.       Physical Exam  Vitals and nursing note reviewed.   Constitutional:       Appearance: Normal appearance. She is well-developed and normal weight.   HENT:      Head: Normocephalic and atraumatic.      Right Ear: Tympanic membrane, ear canal and external ear normal.      Left Ear: Tympanic membrane, ear canal and external ear normal.      Nose: Nose normal.      Mouth/Throat:      Lips: Pink.      Mouth: Mucous membranes are moist.      Tongue: No lesions.      Palate: No mass and lesions.      Pharynx: Oropharynx is clear. Uvula midline.      Tonsils: No tonsillar exudate.   Eyes:      Conjunctiva/sclera: Conjunctivae normal.      Pupils: Pupils are equal, round, and reactive to light.   Neck:      Thyroid: No thyromegaly.   Cardiovascular:      Rate and Rhythm: Normal rate and regular rhythm.      Pulses: Normal pulses.           Dorsalis pedis pulses are 2+ on the right side and 2+ on the left side.        Posterior tibial pulses are 2+ on the right side and 2+ on the left side.      Heart sounds: Normal heart sounds. No murmur heard.  Pulmonary:      Effort: Pulmonary effort is normal.      Breath sounds: Normal breath sounds.   Abdominal:      General: Bowel sounds are normal. There is no distension.      Palpations: Abdomen is soft.      Tenderness: There is no abdominal tenderness.   Musculoskeletal:         General: No deformity. Normal range of motion.      Cervical back: Normal range of motion and neck supple.      Right lower leg: No edema.      Left lower leg: No edema.   Lymphadenopathy:      Head:      Right side of head: No submental, submandibular, tonsillar, preauricular, posterior auricular or occipital adenopathy.      Left side of head: No submental, submandibular, tonsillar, preauricular, posterior auricular or occipital adenopathy.      Cervical: No cervical adenopathy.      Right cervical: No  superficial, deep or posterior cervical adenopathy.     Left cervical: No superficial, deep or posterior cervical adenopathy.      Upper Body:      Right upper body: No supraclavicular adenopathy.      Left upper body: No supraclavicular adenopathy.   Skin:     General: Skin is warm and dry.      Capillary Refill: Capillary refill takes 2 to 3 seconds.   Neurological:      General: No focal deficit present.      Mental Status: She is alert and oriented to person, place, and time.      Cranial Nerves: No cranial nerve deficit.      Sensory: Sensation is intact.      Motor: Motor function is intact.      Coordination: Coordination is intact.      Gait: Gait is intact.   Psychiatric:         Attention and Perception: Attention and perception normal.         Mood and Affect: Mood and affect normal.         Speech: Speech normal.         Behavior: Behavior normal. Behavior is cooperative.         Thought Content: Thought content normal.         Cognition and Memory: Cognition and memory normal.         Judgment: Judgment normal.         Results         Procedures    Diagnoses and all orders for this visit:    1. Annual physical exam (Primary)    2. Encounter for screening for other metabolic disorders  -     CBC & Differential  -     Comprehensive Metabolic Panel    3. Screening for hyperlipidemia  -     Lipid Panel With / Chol / HDL Ratio    4. Multiple thyroid nodules  -     US Thyroid           1. Hormone replacement therapy.  She has been on progesterone for a year and started estrogen suppositories last week. She reports experiencing hot flashes and irritability over the past couple of months. The estrogen suppository mixture also contains testosterone. She is concerned about the potential impact of hormone therapy on her Crohn's disease, which she currently manages with diet alone.    2. Crohn's disease.  She is not on any medication for Crohn's and has managed it with diet so far. She reports no current symptoms  such as diarrhea, constipation, or blood in the stool. She has a CAT scan scheduled in 2 weeks to monitor her condition. Her GI specialist is Ade Zaragoza.    3. Health maintenance.  Routine laboratory tests will be conducted to monitor her overall health status.       Problems Addressed this Visit    None  Visit Diagnoses       Annual physical exam    -  Primary    Encounter for screening for other metabolic disorders        Relevant Orders    CBC & Differential    Comprehensive Metabolic Panel    Screening for hyperlipidemia        Relevant Orders    Lipid Panel With / Chol / HDL Ratio    Multiple thyroid nodules        Relevant Orders    US Thyroid          Diagnoses         Codes Comments    Annual physical exam    -  Primary ICD-10-CM: Z00.00  ICD-9-CM: V70.0     Encounter for screening for other metabolic disorders     ICD-10-CM: Z13.228  ICD-9-CM: V77.99     Screening for hyperlipidemia     ICD-10-CM: Z13.220  ICD-9-CM: V77.91     Multiple thyroid nodules     ICD-10-CM: E04.2  ICD-9-CM: 241.1             Routine health maintenance reviewed and discussed with Clarisse Moore.    Preventative counseling regarding healthy diet and exercise.   Pt reports that he wears a seatbelt regularly.     Patient or patient representative verbalized consent for the use of Ambient Listening during the visit with  TIERNEY Garcia for chart documentation. 3/6/2025  08:39 EST    Return in about 1 year (around 3/6/2026) for Annual, Labs.

## 2025-03-07 DIAGNOSIS — F40.243 FEAR OF FLYING: ICD-10-CM

## 2025-03-07 RX ORDER — ALPRAZOLAM 0.25 MG
0.25 TABLET ORAL 2 TIMES DAILY PRN
Qty: 6 TABLET | Refills: 0 | Status: SHIPPED | OUTPATIENT
Start: 2025-03-07

## 2025-03-20 ENCOUNTER — HOSPITAL ENCOUNTER (OUTPATIENT)
Facility: HOSPITAL | Age: 50
Discharge: HOME | End: 2025-03-20
Admitting: INTERNAL MEDICINE
Payer: COMMERCIAL

## 2025-03-20 PROCEDURE — 25510000001 IOPAMIDOL 61 % SOLUTION: Performed by: INTERNAL MEDICINE

## 2025-03-20 PROCEDURE — 74177 CT ABD & PELVIS W/CONTRAST: CPT

## 2025-03-20 RX ORDER — IOPAMIDOL 612 MG/ML
100 INJECTION, SOLUTION INTRAVASCULAR
Status: COMPLETED | OUTPATIENT
Start: 2025-03-20 | End: 2025-03-20

## 2025-03-20 RX ADMIN — Medication 500 ML: at 10:12

## 2025-03-20 RX ADMIN — Medication 500 ML: at 10:32

## 2025-03-20 RX ADMIN — Medication 500 ML: at 10:52

## 2025-03-20 RX ADMIN — IOPAMIDOL 100 ML: 612 INJECTION, SOLUTION INTRAVENOUS at 11:20

## 2025-04-08 ENCOUNTER — HOSPITAL ENCOUNTER (OUTPATIENT)
Facility: HOSPITAL | Age: 50
Discharge: HOME | End: 2025-04-08
Admitting: NURSE PRACTITIONER
Payer: COMMERCIAL

## 2025-04-08 PROCEDURE — 76536 US EXAM OF HEAD AND NECK: CPT

## 2025-04-18 RX ORDER — BUDESONIDE 3 MG/1
9 CAPSULE, COATED PELLETS ORAL EVERY MORNING
Qty: 90 CAPSULE | Refills: 5 | Status: SHIPPED | OUTPATIENT
Start: 2025-04-18

## 2025-04-18 RX ORDER — ONDANSETRON 4 MG/1
4 TABLET, FILM COATED ORAL EVERY 8 HOURS PRN
Qty: 30 TABLET | Refills: 5 | COMMUNITY

## 2025-04-23 RX ORDER — ONDANSETRON 4 MG/1
4 TABLET, FILM COATED ORAL EVERY 8 HOURS PRN
Qty: 30 TABLET | Refills: 5 | Status: SHIPPED | OUTPATIENT
Start: 2025-04-23

## (undated) DEVICE — THE TORRENT IRRIGATION SCOPE CONNECTOR IS USED WITH THE TORRENT IRRIGATION TUBING TO PROVIDE IRRIGATION FLUIDS SUCH AS STERILE WATER DURING GASTROINTESTINAL ENDOSCOPIC PROCEDURES WHEN USED IN CONJUNCTION WITH AN IRRIGATION PUMP (OR ELECTROSURGICAL UNIT).: Brand: TORRENT

## (undated) DEVICE — LN SMPL CO2 SHTRM SD STREAM W/M LUER

## (undated) DEVICE — TUBING, SUCTION, 1/4" X 10', STRAIGHT: Brand: MEDLINE

## (undated) DEVICE — KT ORCA ORCAPOD DISP STRL

## (undated) DEVICE — SENSR O2 OXIMAX FNGR A/ 18IN NONSTR

## (undated) DEVICE — CANN O2 ETCO2 FITS ALL CONN CO2 SMPL A/ 7IN DISP LF

## (undated) DEVICE — SINGLE-USE BIOPSY FORCEPS: Brand: RADIAL JAW 4

## (undated) DEVICE — ADAPT CLN BIOGUARD AIR/H2O DISP

## (undated) DEVICE — SNAR POLYP SENSATION STDOVL 27 240 BX40

## (undated) DEVICE — THE SINGLE USE ETRAP – POLYP TRAP IS USED FOR SUCTION RETRIEVAL OF ENDOSCOPICALLY REMOVED POLYPS.: Brand: ETRAP